# Patient Record
Sex: MALE | Race: WHITE | Employment: UNEMPLOYED | URBAN - NONMETROPOLITAN AREA
[De-identification: names, ages, dates, MRNs, and addresses within clinical notes are randomized per-mention and may not be internally consistent; named-entity substitution may affect disease eponyms.]

---

## 2017-07-16 ENCOUNTER — APPOINTMENT (OUTPATIENT)
Dept: GENERAL RADIOLOGY | Age: 57
End: 2017-07-16
Payer: COMMERCIAL

## 2017-07-16 ENCOUNTER — HOSPITAL ENCOUNTER (EMERGENCY)
Age: 57
Discharge: ANOTHER HOSPITAL | End: 2017-07-17
Attending: FAMILY MEDICINE
Payer: COMMERCIAL

## 2017-07-16 ENCOUNTER — APPOINTMENT (OUTPATIENT)
Dept: CT IMAGING | Age: 57
End: 2017-07-16
Payer: COMMERCIAL

## 2017-07-16 VITALS
HEART RATE: 86 BPM | SYSTOLIC BLOOD PRESSURE: 156 MMHG | WEIGHT: 145 LBS | BODY MASS INDEX: 24.75 KG/M2 | HEIGHT: 64 IN | OXYGEN SATURATION: 100 % | RESPIRATION RATE: 16 BRPM | TEMPERATURE: 97.6 F | DIASTOLIC BLOOD PRESSURE: 76 MMHG

## 2017-07-16 DIAGNOSIS — D72.829 LEUKOCYTOSIS, UNSPECIFIED TYPE: Primary | ICD-10-CM

## 2017-07-16 DIAGNOSIS — R40.20 LOC (LOSS OF CONSCIOUSNESS) (HCC): ICD-10-CM

## 2017-07-16 DIAGNOSIS — E16.2 HYPOGLYCEMIA: ICD-10-CM

## 2017-07-16 LAB
ACETAMINOPHEN LEVEL: < 5 UG/ML (ref 0–20)
AMPHETAMINE+METHAMPHETAMINE URINE SCREEN: NEGATIVE
ANION GAP SERPL CALCULATED.3IONS-SCNC: 16 MEQ/L (ref 8–16)
BACTERIA: ABNORMAL /HPF
BARBITURATE QUANTITATIVE URINE: NEGATIVE
BENZODIAZEPINE QUANTITATIVE URINE: NEGATIVE
BILIRUBIN URINE: NEGATIVE
BLOOD, URINE: NEGATIVE
BUN BLDV-MCNC: 23 MG/DL (ref 7–22)
CALCIUM SERPL-MCNC: 9.5 MG/DL (ref 8.5–10.5)
CANNABINOID QUANTITATIVE URINE: NEGATIVE
CASTS 2: ABNORMAL /LPF
CASTS UA: ABNORMAL /LPF
CHARACTER, URINE: CLEAR
CHLORIDE BLD-SCNC: 93 MEQ/L (ref 98–111)
CO2: 25 MEQ/L (ref 23–33)
COCAINE METABOLITE QUANTITATIVE URINE: NEGATIVE
COLOR: YELLOW
CREAT SERPL-MCNC: 0.8 MG/DL (ref 0.4–1.2)
CRYSTALS, UA: ABNORMAL
DIFFERENTIAL, MANUAL: NORMAL
EPITHELIAL CELLS, UA: ABNORMAL /HPF
ETHYL ALCOHOL, SERUM: < 0.01 %
GFR SERPL CREATININE-BSD FRML MDRD: > 90 ML/MIN/1.73M2
GLUCOSE BLD-MCNC: 123 MG/DL (ref 70–108)
GLUCOSE BLD-MCNC: 142 MG/DL (ref 70–108)
GLUCOSE BLD-MCNC: 147 MG/DL (ref 70–108)
GLUCOSE BLD-MCNC: 153 MG/DL (ref 70–108)
GLUCOSE BLD-MCNC: 94 MG/DL (ref 70–108)
GLUCOSE URINE: 250 MG/DL
KETONES, URINE: NEGATIVE
LACTIC ACID: 2.2 MMOL/L (ref 0.5–2.2)
LEUKOCYTE ESTERASE, URINE: NEGATIVE
MISCELLANEOUS 2: ABNORMAL
NITRITE, URINE: NEGATIVE
OPIATES, URINE: NEGATIVE
OXYCODONE: NEGATIVE
PH UA: 7
PHENCYCLIDINE QUANTITATIVE URINE: NEGATIVE
POTASSIUM SERPL-SCNC: 4.7 MEQ/L (ref 3.5–5.2)
PROTEIN UA: NEGATIVE
RBC URINE: ABNORMAL /HPF
RENAL EPITHELIAL, UA: ABNORMAL
SALICYLATE, SERUM: < 0.3 MG/DL (ref 2–10)
SODIUM BLD-SCNC: 134 MEQ/L (ref 135–145)
SPECIFIC GRAVITY, URINE: 1.02 (ref 1–1.03)
TOTAL CK: 42 U/L (ref 55–170)
UROBILINOGEN, URINE: 1 EU/DL
WBC UA: ABNORMAL /HPF
YEAST: ABNORMAL

## 2017-07-16 PROCEDURE — 80329 ANALGESICS NON-OPIOID 1 OR 2: CPT

## 2017-07-16 PROCEDURE — G0480 DRUG TEST DEF 1-7 CLASSES: HCPCS

## 2017-07-16 PROCEDURE — 2580000003 HC RX 258: Performed by: FAMILY MEDICINE

## 2017-07-16 PROCEDURE — 36415 COLL VENOUS BLD VENIPUNCTURE: CPT

## 2017-07-16 PROCEDURE — 80048 BASIC METABOLIC PNL TOTAL CA: CPT

## 2017-07-16 PROCEDURE — 99285 EMERGENCY DEPT VISIT HI MDM: CPT

## 2017-07-16 PROCEDURE — 72125 CT NECK SPINE W/O DYE: CPT

## 2017-07-16 PROCEDURE — 83605 ASSAY OF LACTIC ACID: CPT

## 2017-07-16 PROCEDURE — 82550 ASSAY OF CK (CPK): CPT

## 2017-07-16 PROCEDURE — S0028 INJECTION, FAMOTIDINE, 20 MG: HCPCS | Performed by: FAMILY MEDICINE

## 2017-07-16 PROCEDURE — 70450 CT HEAD/BRAIN W/O DYE: CPT

## 2017-07-16 PROCEDURE — 82948 REAGENT STRIP/BLOOD GLUCOSE: CPT

## 2017-07-16 PROCEDURE — 80307 DRUG TEST PRSMV CHEM ANLYZR: CPT

## 2017-07-16 PROCEDURE — 85025 COMPLETE CBC W/AUTO DIFF WBC: CPT

## 2017-07-16 PROCEDURE — 2500000003 HC RX 250 WO HCPCS: Performed by: FAMILY MEDICINE

## 2017-07-16 PROCEDURE — 71010 XR CHEST PORTABLE: CPT

## 2017-07-16 PROCEDURE — 87040 BLOOD CULTURE FOR BACTERIA: CPT

## 2017-07-16 PROCEDURE — 96374 THER/PROPH/DIAG INJ IV PUSH: CPT

## 2017-07-16 PROCEDURE — 80320 DRUG SCREEN QUANTALCOHOLS: CPT

## 2017-07-16 PROCEDURE — 81001 URINALYSIS AUTO W/SCOPE: CPT

## 2017-07-16 RX ORDER — SODIUM CHLORIDE, SODIUM LACTATE, POTASSIUM CHLORIDE, CALCIUM CHLORIDE 600; 310; 30; 20 MG/100ML; MG/100ML; MG/100ML; MG/100ML
INJECTION, SOLUTION INTRAVENOUS CONTINUOUS
Status: DISCONTINUED | OUTPATIENT
Start: 2017-07-16 | End: 2017-07-17 | Stop reason: HOSPADM

## 2017-07-16 RX ORDER — BUDESONIDE AND FORMOTEROL FUMARATE DIHYDRATE 80; 4.5 UG/1; UG/1
2 AEROSOL RESPIRATORY (INHALATION) 2 TIMES DAILY
COMMUNITY

## 2017-07-16 RX ORDER — CALCIUM CARBONATE 200(500)MG
1 TABLET,CHEWABLE ORAL 4 TIMES DAILY PRN
COMMUNITY

## 2017-07-16 RX ORDER — AMITRIPTYLINE HYDROCHLORIDE 50 MG/1
50 TABLET, FILM COATED ORAL NIGHTLY
COMMUNITY

## 2017-07-16 RX ORDER — ASPIRIN 81 MG/1
81 TABLET ORAL DAILY
COMMUNITY

## 2017-07-16 RX ORDER — DIVALPROEX SODIUM 500 MG/1
500 TABLET, DELAYED RELEASE ORAL 3 TIMES DAILY
COMMUNITY
End: 2019-02-03

## 2017-07-16 RX ORDER — ALBUTEROL SULFATE 90 UG/1
2 AEROSOL, METERED RESPIRATORY (INHALATION) EVERY 6 HOURS PRN
COMMUNITY

## 2017-07-16 RX ORDER — FAMOTIDINE 20 MG/1
20 TABLET, FILM COATED ORAL DAILY
COMMUNITY
End: 2019-06-11

## 2017-07-16 RX ORDER — LOXAPINE SUCCINATE 10 MG/1
10 TABLET ORAL 2 TIMES DAILY
COMMUNITY
End: 2019-06-10

## 2017-07-16 RX ORDER — LISINOPRIL 5 MG/1
5 TABLET ORAL DAILY
COMMUNITY
End: 2019-02-03

## 2017-07-16 RX ADMIN — FAMOTIDINE 20 MG: 10 INJECTION, SOLUTION INTRAVENOUS at 23:40

## 2017-07-16 RX ADMIN — SODIUM CHLORIDE, POTASSIUM CHLORIDE, SODIUM LACTATE AND CALCIUM CHLORIDE: 600; 310; 30; 20 INJECTION, SOLUTION INTRAVENOUS at 19:43

## 2017-07-16 ASSESSMENT — PAIN SCALES - GENERAL: PAINLEVEL_OUTOF10: 8

## 2017-07-16 ASSESSMENT — ENCOUNTER SYMPTOMS
WHEEZING: 0
SORE THROAT: 0
SHORTNESS OF BREATH: 0
VOMITING: 0
EYE REDNESS: 0
DIARRHEA: 0
EYE DISCHARGE: 0
BACK PAIN: 0
ABDOMINAL PAIN: 0
RHINORRHEA: 0
NAUSEA: 0
COUGH: 0

## 2017-07-16 ASSESSMENT — PAIN DESCRIPTION - PAIN TYPE: TYPE: ACUTE PAIN

## 2017-07-16 ASSESSMENT — PAIN DESCRIPTION - FREQUENCY: FREQUENCY: CONTINUOUS

## 2017-07-16 ASSESSMENT — PAIN DESCRIPTION - ORIENTATION: ORIENTATION: LEFT

## 2017-07-16 ASSESSMENT — PAIN DESCRIPTION - LOCATION: LOCATION: RIB CAGE

## 2017-07-16 NOTE — ED NOTES
Patient resting on cot, states \"I feel much better. \" Patient alert and oriented at this time and shows no signs of hypoglycemia currently. No other needs voiced.      Lazarus Basset, RN  07/16/17 1944

## 2017-07-16 NOTE — ED PROVIDER NOTES
[facility]  eMERGENCY dEPARTMENT eNCOUnter          CHIEF COMPLAINT       Chief Complaint   Patient presents with    Hypoglycemia       Nurses Notes reviewed and I agree except as noted in the HPI. HISTORY OF PRESENT ILLNESS    Laura Correa is a 59 y.o. male who presents to the ED with CO for evaluation of hypoglycemia. The patient has history of type 1 diabetes. He states he takes long term and short term insulin but he is unsure of the name, in addition to sliding scale. During the count, they noticed the patient was unresponsive today. Nurse took his blood sugar and was noted to have a level of 30. He was given glucagon and patient regained consciousness after that. Patient had blood sugar level of 94 on arrival here. The patient states he has history of low blood sugar but has never passed out in the past. He states he received insulin 2 hours ago. He reports he did have a meal but was unable to eat it. The patient denies of any pain and states he feels back to normal. He admits to smoking cigarettes and denies drug and alcohol use. He also reports history of seizures and is unable to name his medication. The nurses are in charge of his medication. The HPI was provided by the patient. REVIEW OF SYSTEMS     Review of Systems   Constitutional: Negative for appetite change, chills, fatigue and fever. HENT: Negative for congestion, ear pain, rhinorrhea and sore throat. Eyes: Negative for discharge, redness and visual disturbance. Respiratory: Negative for cough, shortness of breath and wheezing. Cardiovascular: Negative for chest pain, palpitations and leg swelling. Gastrointestinal: Negative for abdominal pain, diarrhea, nausea and vomiting. Genitourinary: Negative for decreased urine volume, difficulty urinating and dysuria. Musculoskeletal: Negative for arthralgias, back pain, joint swelling and neck pain. Skin: Negative for pallor and rash. otherwise change. CRITICAL CARE:   None     CONSULTS:  None    PROCEDURES:  None     FINAL IMPRESSION      1. Leukocytosis, unspecified type    2. Hypoglycemia    3. LOC (loss of consciousness) ([facility] [address]          DISPOSITION/PLAN   Discharge back to [facility] inmate unit    PATIENT REFERRED TO:  No follow-up provider specified. DISCHARGE MEDICATIONS:  Discharge Medication List as of 7/17/2017 12:04 AM          (Please note that portions of this note were completed with a voice recognition program.  Efforts were made to edit the dictations but occasionally words are mis-transcribed.)    Scribe:  Yee Capone 7/16/17 6:27 PM Scribing for and in the presence of Flavia Vera MD.    Signed by: Pedro Lin, 07/17/17 2:19 AM    Provider:  I personally performed the services described in the documentation, reviewed and edited the documentation which was dictated to the scribe in my presence, and it accurately records my words and actions.     Flavia Vera MD 7/16/17 2:19 AM         Flavia Vera MD  07/17/17 0944

## 2017-07-17 LAB
ANISOCYTOSIS: ABNORMAL
BANDED NEUTROPHILS ABSOLUTE COUNT: 0.6 THOU/MM3
BANDS PRESENT: 2 %
BASOPHILS # BLD: 0 %
BASOPHILS ABSOLUTE: 0 THOU/MM3 (ref 0–0.1)
EOSINOPHIL # BLD: 0 %
EOSINOPHILS ABSOLUTE: 0 THOU/MM3 (ref 0–0.4)
HCT VFR BLD CALC: 33.8 % (ref 42–52)
HEMOGLOBIN: 11.4 GM/DL (ref 14–18)
LYMPHOCYTES # BLD: 6 %
LYMPHOCYTES ABSOLUTE: 1.7 THOU/MM3 (ref 1–4.8)
MCH RBC QN AUTO: 28.2 PG (ref 27–31)
MCHC RBC AUTO-ENTMCNC: 33.7 GM/DL (ref 33–37)
MCV RBC AUTO: 83.7 FL (ref 80–94)
MONOCYTES # BLD: 5 %
MONOCYTES ABSOLUTE: 1.4 THOU/MM3 (ref 0.4–1.3)
NUCLEATED RED BLOOD CELLS: 0 /100 WBC
PATHOLOGIST REVIEW: ABNORMAL
PDW BLD-RTO: 19.1 % (ref 11.5–14.5)
PLATELET # BLD: 305 THOU/MM3 (ref 130–400)
PLATELET ESTIMATE: ADEQUATE
PMV BLD AUTO: 7.8 MCM (ref 7.4–10.4)
RBC # BLD: 4.04 MILL/MM3 (ref 4.7–6.1)
RBC # BLD: NORMAL 10*6/UL
SEG NEUTROPHILS: 87 %
SEGMENTED NEUTROPHILS ABSOLUTE COUNT: 25.1 THOU/MM3 (ref 1.8–7.7)
WBC # BLD: 28.9 THOU/MM3 (ref 4.8–10.8)

## 2017-07-17 NOTE — ED NOTES
Pt complaining of some heartburn and requesting medication to relieve his pain. Dr. Kike Wilkes notified of pt's request and verbal order for medication obtained and order placed.      Sina Rooney RN  07/16/17 4416

## 2017-07-20 ENCOUNTER — HOSPITAL ENCOUNTER (EMERGENCY)
Age: 57
Discharge: ANOTHER HOSPITAL | End: 2017-07-20
Attending: INTERNAL MEDICINE
Payer: COMMERCIAL

## 2017-07-20 ENCOUNTER — APPOINTMENT (OUTPATIENT)
Dept: CT IMAGING | Age: 57
End: 2017-07-20
Payer: COMMERCIAL

## 2017-07-20 VITALS
TEMPERATURE: 100.3 F | HEIGHT: 64 IN | RESPIRATION RATE: 18 BRPM | SYSTOLIC BLOOD PRESSURE: 169 MMHG | BODY MASS INDEX: 24.75 KG/M2 | OXYGEN SATURATION: 97 % | HEART RATE: 108 BPM | DIASTOLIC BLOOD PRESSURE: 83 MMHG | WEIGHT: 145 LBS

## 2017-07-20 DIAGNOSIS — E87.20 LACTIC ACIDOSIS: ICD-10-CM

## 2017-07-20 DIAGNOSIS — K92.0 HEMATEMESIS WITH NAUSEA: Primary | ICD-10-CM

## 2017-07-20 DIAGNOSIS — E87.1 HYPONATREMIA: ICD-10-CM

## 2017-07-20 DIAGNOSIS — R73.9 HYPERGLYCEMIA: ICD-10-CM

## 2017-07-20 LAB
ALBUMIN SERPL-MCNC: 4.2 G/DL (ref 3.5–5.1)
ALP BLD-CCNC: 106 U/L (ref 38–126)
ALT SERPL-CCNC: 11 U/L (ref 11–66)
AMYLASE: 51 U/L (ref 20–104)
ANION GAP SERPL CALCULATED.3IONS-SCNC: 19 MEQ/L (ref 8–16)
ANISOCYTOSIS: ABNORMAL
AST SERPL-CCNC: 12 U/L (ref 5–40)
BASOPHILS # BLD: 0.3 %
BASOPHILS ABSOLUTE: 0.1 THOU/MM3 (ref 0–0.1)
BILIRUB SERPL-MCNC: 0.3 MG/DL (ref 0.3–1.2)
BILIRUBIN DIRECT: < 0.2 MG/DL (ref 0–0.3)
BUN BLDV-MCNC: 19 MG/DL (ref 7–22)
CALCIUM SERPL-MCNC: 10.1 MG/DL (ref 8.5–10.5)
CHLORIDE BLD-SCNC: 84 MEQ/L (ref 98–111)
CO2: 25 MEQ/L (ref 23–33)
CREAT SERPL-MCNC: 0.7 MG/DL (ref 0.4–1.2)
EOSINOPHIL # BLD: 0 %
EOSINOPHILS ABSOLUTE: 0 THOU/MM3 (ref 0–0.4)
GFR SERPL CREATININE-BSD FRML MDRD: > 90 ML/MIN/1.73M2
GLUCOSE BLD-MCNC: 301 MG/DL (ref 70–108)
GLUCOSE BLD-MCNC: 332 MG/DL (ref 70–108)
HCT VFR BLD CALC: 37.3 % (ref 42–52)
HEMOCCULT STL QL: POSITIVE
HEMOGLOBIN: 12.6 GM/DL (ref 14–18)
LACTIC ACID: 3 MMOL/L (ref 0.5–2.2)
LIPASE: 7.9 U/L (ref 5.6–51.3)
LYMPHOCYTES # BLD: 2 %
LYMPHOCYTES ABSOLUTE: 0.5 THOU/MM3 (ref 1–4.8)
MCH RBC QN AUTO: 28.3 PG (ref 27–31)
MCHC RBC AUTO-ENTMCNC: 33.6 GM/DL (ref 33–37)
MCV RBC AUTO: 84.1 FL (ref 80–94)
MONOCYTES # BLD: 6.2 %
MONOCYTES ABSOLUTE: 1.6 THOU/MM3 (ref 0.4–1.3)
NUCLEATED RED BLOOD CELLS: 0 /100 WBC
OSMOLALITY CALCULATION: 272.3 MOSMOL/KG (ref 275–300)
PDW BLD-RTO: 18.2 % (ref 11.5–14.5)
PLATELET # BLD: 354 THOU/MM3 (ref 130–400)
PMV BLD AUTO: 8.4 MCM (ref 7.4–10.4)
POTASSIUM SERPL-SCNC: 5.1 MEQ/L (ref 3.5–5.2)
PRO-BNP: 74.1 PG/ML (ref 0–900)
RBC # BLD: 4.44 MILL/MM3 (ref 4.7–6.1)
RBC # BLD: NORMAL 10*6/UL
SEG NEUTROPHILS: 91.5 %
SEGMENTED NEUTROPHILS ABSOLUTE COUNT: 23.2 THOU/MM3 (ref 1.8–7.7)
SODIUM BLD-SCNC: 128 MEQ/L (ref 135–145)
TOTAL PROTEIN: 7.5 G/DL (ref 6.1–8)
TROPONIN T: < 0.01 NG/ML
WBC # BLD: 25.4 THOU/MM3 (ref 4.8–10.8)

## 2017-07-20 PROCEDURE — C9113 INJ PANTOPRAZOLE SODIUM, VIA: HCPCS | Performed by: INTERNAL MEDICINE

## 2017-07-20 PROCEDURE — 96375 TX/PRO/DX INJ NEW DRUG ADDON: CPT

## 2017-07-20 PROCEDURE — 85025 COMPLETE CBC W/AUTO DIFF WBC: CPT

## 2017-07-20 PROCEDURE — 82948 REAGENT STRIP/BLOOD GLUCOSE: CPT

## 2017-07-20 PROCEDURE — 80053 COMPREHEN METABOLIC PANEL: CPT

## 2017-07-20 PROCEDURE — 99285 EMERGENCY DEPT VISIT HI MDM: CPT

## 2017-07-20 PROCEDURE — 82272 OCCULT BLD FECES 1-3 TESTS: CPT

## 2017-07-20 PROCEDURE — 83880 ASSAY OF NATRIURETIC PEPTIDE: CPT

## 2017-07-20 PROCEDURE — 82248 BILIRUBIN DIRECT: CPT

## 2017-07-20 PROCEDURE — 84484 ASSAY OF TROPONIN QUANT: CPT

## 2017-07-20 PROCEDURE — 96376 TX/PRO/DX INJ SAME DRUG ADON: CPT

## 2017-07-20 PROCEDURE — 74176 CT ABD & PELVIS W/O CONTRAST: CPT

## 2017-07-20 PROCEDURE — 2580000003 HC RX 258: Performed by: INTERNAL MEDICINE

## 2017-07-20 PROCEDURE — 93005 ELECTROCARDIOGRAM TRACING: CPT

## 2017-07-20 PROCEDURE — 6370000000 HC RX 637 (ALT 250 FOR IP): Performed by: INTERNAL MEDICINE

## 2017-07-20 PROCEDURE — 36415 COLL VENOUS BLD VENIPUNCTURE: CPT

## 2017-07-20 PROCEDURE — 96366 THER/PROPH/DIAG IV INF ADDON: CPT

## 2017-07-20 PROCEDURE — 82150 ASSAY OF AMYLASE: CPT

## 2017-07-20 PROCEDURE — 6360000002 HC RX W HCPCS: Performed by: INTERNAL MEDICINE

## 2017-07-20 PROCEDURE — 6370000000 HC RX 637 (ALT 250 FOR IP)

## 2017-07-20 PROCEDURE — 96365 THER/PROPH/DIAG IV INF INIT: CPT

## 2017-07-20 PROCEDURE — 83605 ASSAY OF LACTIC ACID: CPT

## 2017-07-20 PROCEDURE — 83690 ASSAY OF LIPASE: CPT

## 2017-07-20 RX ORDER — ACETAMINOPHEN 650 MG/1
650 SUPPOSITORY RECTAL ONCE
Status: COMPLETED | OUTPATIENT
Start: 2017-07-20 | End: 2017-07-20

## 2017-07-20 RX ORDER — PANTOPRAZOLE SODIUM 40 MG/10ML
40 INJECTION, POWDER, LYOPHILIZED, FOR SOLUTION INTRAVENOUS ONCE
Status: COMPLETED | OUTPATIENT
Start: 2017-07-20 | End: 2017-07-20

## 2017-07-20 RX ORDER — ACETAMINOPHEN 650 MG/1
SUPPOSITORY RECTAL
Status: COMPLETED
Start: 2017-07-20 | End: 2017-07-20

## 2017-07-20 RX ORDER — 0.9 % SODIUM CHLORIDE 0.9 %
1000 INTRAVENOUS SOLUTION INTRAVENOUS ONCE
Status: COMPLETED | OUTPATIENT
Start: 2017-07-20 | End: 2017-07-20

## 2017-07-20 RX ORDER — METOCLOPRAMIDE HYDROCHLORIDE 5 MG/ML
10 INJECTION INTRAMUSCULAR; INTRAVENOUS ONCE
Status: COMPLETED | OUTPATIENT
Start: 2017-07-20 | End: 2017-07-20

## 2017-07-20 RX ADMIN — SODIUM CHLORIDE 8 MG/HR: 9 INJECTION, SOLUTION INTRAVENOUS at 11:51

## 2017-07-20 RX ADMIN — ACETAMINOPHEN 650 MG: 650 SUPPOSITORY RECTAL at 15:47

## 2017-07-20 RX ADMIN — METOCLOPRAMIDE 10 MG: 5 INJECTION, SOLUTION INTRAMUSCULAR; INTRAVENOUS at 11:42

## 2017-07-20 RX ADMIN — Medication 15 ML: at 11:49

## 2017-07-20 RX ADMIN — PANTOPRAZOLE SODIUM 40 MG: 40 INJECTION, POWDER, FOR SOLUTION INTRAVENOUS at 11:47

## 2017-07-20 RX ADMIN — SODIUM CHLORIDE 1000 ML: 9 INJECTION, SOLUTION INTRAVENOUS at 11:28

## 2017-07-20 ASSESSMENT — ENCOUNTER SYMPTOMS
COUGH: 0
BACK PAIN: 0
VOMITING: 1
DIARRHEA: 0
WHEEZING: 0
ABDOMINAL PAIN: 1
SHORTNESS OF BREATH: 0
SORE THROAT: 0
BLOOD IN STOOL: 0
NAUSEA: 0

## 2017-07-20 ASSESSMENT — PAIN DESCRIPTION - LOCATION: LOCATION: ABDOMEN

## 2017-07-20 ASSESSMENT — PAIN DESCRIPTION - FREQUENCY: FREQUENCY: CONTINUOUS

## 2017-07-20 ASSESSMENT — PAIN SCALES - GENERAL: PAINLEVEL_OUTOF10: 10

## 2017-07-20 ASSESSMENT — PAIN DESCRIPTION - DESCRIPTORS: DESCRIPTORS: BURNING

## 2017-07-20 ASSESSMENT — PAIN DESCRIPTION - ORIENTATION: ORIENTATION: UPPER;MID;RIGHT;LEFT

## 2017-07-20 ASSESSMENT — PAIN DESCRIPTION - PAIN TYPE: TYPE: ACUTE PAIN

## 2017-07-20 NOTE — ED NOTES
Patient resting quietly in cot with guards at bedside. Denies any pain at this time. States he is nauseated. Dr. Tri Carcamo notified. Will continue to monitor.       Shaniqua Cordova RN  07/20/17 7326

## 2017-07-20 NOTE — ED NOTES
Dr. Chelsea Lundberg notified of patient's WBC of 25.4 and blood sugar of 301.        Reid Tee, SLIME  07/20/17 1086

## 2017-07-20 NOTE — ED PROVIDER NOTES
nervous/anxious. All other systems reviewed and are negative. PAST MEDICAL HISTORY    has a past medical history of Bipolar 1 disorder ([facility] [address] Cellulitis; COPD (chronic obstructive pulmonary disease) ([facility] [address] Diabetes mellitus ([facility] [address] GERD (gastroesophageal reflux disease); Headache; Hypertension; Hypothyroidism; Mandible fracture ([facility] [address] Multiple rib fractures; [facility] Sinusitis; and Subtrochanteric fracture ([facility] SURGICAL HISTORY      has a past surgical history that includes Appendectomy. CURRENT MEDICATIONS       Previous Medications    ALBUTEROL SULFATE  (90 BASE) MCG/ACT INHALER    Inhale 2 puffs into the lungs every 6 hours as needed for Wheezing    AMITRIPTYLINE (ELAVIL) 50 MG TABLET    Take 50 mg by mouth nightly    ASPIRIN EC 81 MG EC TABLET    Take 81 mg by mouth daily    BUDESONIDE-FORMOTEROL (SYMBICORT) 80-4.5 MCG/ACT AERO    Inhale 2 puffs into the lungs 2 times daily    CALCIUM CARBONATE (TUMS) 500 MG CHEWABLE TABLET    Take 1 tablet by mouth 4 times daily as needed for Heartburn 1-2 daily QID PRN    DIVALPROEX (DEPAKOTE) 500 MG DR TABLET    Take 500 mg by mouth 3 times daily    FAMOTIDINE (PEPCID) 20 MG TABLET    Take 20 mg by mouth daily    INSULIN NPH (HUMULIN N;NOVOLIN N) 100 UNIT/ML INJECTION VIAL    Inject 16 Units into the skin every morning    INSULIN NPH (HUMULIN N;NOVOLIN N) 100 UNIT/ML INJECTION VIAL    Inject 10 Units into the skin nightly Indications: with dinner    INSULIN REGULAR (HUMULIN R;NOVOLIN R) 100 UNIT/ML INJECTION    Inject 8 Units into the skin See Admin Instructions Indications: 8 units before meals three times a day.  Sliding scale  Greater than 401 call MD    LISINOPRIL (PRINIVIL;ZESTRIL) 5 MG TABLET    Take 5 mg by mouth daily    LOXAPINE (LOXITANE) 10 MG CAPSULE    Take 10 mg by mouth 2 times daily 1 tab PO every AM, and 3 tabs PO every PM.    METFORMIN (GLUCOPHAGE) 500 MG TABLET    Take 500 mg by mouth 2 times daily (with meals)       ALLERGIES is allergic to sulfa antibiotics and sulphasomidine. FAMILY HISTORY     has no family status information on file. family history is not on file. SOCIAL HISTORY      reports that he has quit smoking. His smoking use included Cigarettes. He smoked 1.00 pack per day. He does not have any smokeless tobacco history on file. He reports that he does not drink alcohol. PHYSICAL EXAM     INITIAL VITALS:  height is 5' 4\" (1.626 m) and weight is 145 lb (65.8 kg). His oral temperature is 99 °F (37.2 °C). His blood pressure is 175/84 (abnormal) and his pulse is 103. His respiration is 18 and oxygen saturation is 100%. Physical Exam   Constitutional: He is oriented to person, place, and time. Vital signs are normal. He appears well-developed and well-nourished. He is cooperative. Non-toxic appearance. He does not appear ill. HENT:   Head: Normocephalic. Right Ear: External ear normal. No drainage. Left Ear: External ear normal. No drainage. Nose: Nose normal. No epistaxis. Mouth/Throat: Mucous membranes are not dry and not cyanotic. Eyes: Conjunctivae and EOM are normal. Right eye exhibits no discharge. Left eye exhibits no discharge. No scleral icterus. Neck: Trachea normal, normal range of motion and phonation normal. Neck supple. No tracheal deviation present. Cardiovascular: Normal rate, regular rhythm, normal heart sounds and intact distal pulses. Exam reveals no gallop and no friction rub. No murmur heard. Pulses:       Radial pulses are 2+ on the right side   Pulmonary/Chest: Effort normal and breath sounds normal. No stridor. No respiratory distress. He has no wheezes. He has no rales. He exhibits no tenderness. Abdominal: Soft. Bowel sounds are normal. He exhibits no distension and no pulsatile midline mass. There is no tenderness. There is no rebound and no guarding. Musculoskeletal: Normal range of motion. He exhibits no edema or tenderness (No calf pain or tenderness.   No components within normal limits   BASIC METABOLIC PANEL - Abnormal; Notable for the following:     Sodium 128 (*)     Chloride 84 (*)     Glucose 332 (*)     All other components within normal limits   LACTIC ACID, PLASMA - Abnormal; Notable for the following:     Lactic Acid 3.0 (*)     All other components within normal limits   TROPONIN   BRAIN NATRIURETIC PEPTIDE   HEPATIC FUNCTION PANEL   AMYLASE   LIPASE   BLOOD OCCULT STOOL SCREEN #1       EMERGENCY DEPARTMENT COURSE:   Vitals:    Vitals:    07/20/17 1046 07/20/17 1211 07/20/17 1317   BP: (!) 162/86 (!) 168/83 (!) 175/84   Pulse: 97 109 103   Resp: 18 18 18   Temp: 99 °F (37.2 °C)     TempSrc: Oral     SpO2: 99% 97% 100%   Weight: 145 lb (65.8 kg)     Height: 5' 4\" (1.626 m)       10:51 AM: The patient was seen and evaluated. Patient was seen history and physical exam was performed. Patient remained stable here in the emergency department. This patient was evaluated by myself. Patient was resting on cart during examination. Patient did not appear in any distress. Physical examination revealed no abdominal tenderness. Patient was given Protonix, Reglan, Gi cocktail, and IV fluids. Patient verbalized some improvement of symptoms with these medications. Labs were ordered and reviewed. NA low at 128. CL low at 84. Glucose elevated at 332. Lactic acid elevated at 3.0. Imaging was ordered and reviewed. CT abdomen pelvis shows 1. The stomach is moderately distended containing fluid. No obvious wall thickening or obvious luminal mass within the stomach. Etiology of coffee-ground emesis. 2. Right posterior basilar pleural thickening and calcification is likely the sequela or an old/remote hemothorax secondary to rib fractures. Patient had a stable course here in the emergency department. However, based on the patient's laboratory values, physical exam findings, and radiologic imaging, it was decided to bring the patient into the hospital for admission.   We

## 2017-07-20 NOTE — ED NOTES
Patient resting quietly in cot with eyes closed and officers at bedside. Will continue to monitor.       Julian Lainez RN  07/20/17 8239

## 2017-07-21 LAB
EKG ATRIAL RATE: 96 BPM
EKG P AXIS: 68 DEGREES
EKG P-R INTERVAL: 146 MS
EKG Q-T INTERVAL: 346 MS
EKG QRS DURATION: 80 MS
EKG QTC CALCULATION (BAZETT): 437 MS
EKG R AXIS: 67 DEGREES
EKG T AXIS: 90 DEGREES
EKG VENTRICULAR RATE: 96 BPM

## 2017-07-22 LAB
BLOOD CULTURE, ROUTINE: NORMAL
BLOOD CULTURE, ROUTINE: NORMAL

## 2018-03-21 ENCOUNTER — APPOINTMENT (OUTPATIENT)
Dept: CT IMAGING | Age: 58
End: 2018-03-21
Payer: COMMERCIAL

## 2018-03-21 ENCOUNTER — HOSPITAL ENCOUNTER (EMERGENCY)
Age: 58
Discharge: ANOTHER HOSPITAL | End: 2018-03-21
Attending: FAMILY MEDICINE
Payer: COMMERCIAL

## 2018-03-21 ENCOUNTER — APPOINTMENT (OUTPATIENT)
Dept: GENERAL RADIOLOGY | Age: 58
End: 2018-03-21
Payer: COMMERCIAL

## 2018-03-21 VITALS
SYSTOLIC BLOOD PRESSURE: 180 MMHG | TEMPERATURE: 99.7 F | HEART RATE: 101 BPM | BODY MASS INDEX: 24.16 KG/M2 | RESPIRATION RATE: 18 BRPM | WEIGHT: 145 LBS | HEIGHT: 65 IN | OXYGEN SATURATION: 97 % | DIASTOLIC BLOOD PRESSURE: 65 MMHG

## 2018-03-21 DIAGNOSIS — A41.9 SEPSIS, DUE TO UNSPECIFIED ORGANISM: ICD-10-CM

## 2018-03-21 DIAGNOSIS — J18.9 PNEUMONIA DUE TO ORGANISM: ICD-10-CM

## 2018-03-21 DIAGNOSIS — D72.829 LEUKOCYTOSIS, UNSPECIFIED TYPE: ICD-10-CM

## 2018-03-21 DIAGNOSIS — R77.8 ELEVATED TROPONIN: ICD-10-CM

## 2018-03-21 DIAGNOSIS — R11.2 NON-INTRACTABLE VOMITING WITH NAUSEA, UNSPECIFIED VOMITING TYPE: Primary | ICD-10-CM

## 2018-03-21 DIAGNOSIS — K92.2 GASTROINTESTINAL HEMORRHAGE, UNSPECIFIED GASTROINTESTINAL HEMORRHAGE TYPE: ICD-10-CM

## 2018-03-21 LAB
ABO: NORMAL
ALBUMIN SERPL-MCNC: 4.4 G/DL (ref 3.5–5.1)
ALLEN TEST: POSITIVE
ALP BLD-CCNC: 93 U/L (ref 38–126)
ALT SERPL-CCNC: 16 U/L (ref 11–66)
AMPHETAMINE+METHAMPHETAMINE URINE SCREEN: NEGATIVE
ANION GAP SERPL CALCULATED.3IONS-SCNC: 19 MEQ/L (ref 8–16)
ANISOCYTOSIS: ABNORMAL
ANTIBODY SCREEN: NORMAL
AST SERPL-CCNC: 19 U/L (ref 5–40)
BACTERIA: ABNORMAL /HPF
BARBITURATE QUANTITATIVE URINE: NEGATIVE
BASE EXCESS (CALCULATED): 3.5 MMOL/L (ref -2.5–2.5)
BASOPHILS # BLD: 0.4 %
BASOPHILS ABSOLUTE: 0.1 THOU/MM3 (ref 0–0.1)
BENZODIAZEPINE QUANTITATIVE URINE: NEGATIVE
BETA-HYDROXYBUTYRATE: 2.84 MG/DL (ref 0.2–2.81)
BILIRUB SERPL-MCNC: 0.3 MG/DL (ref 0.3–1.2)
BILIRUBIN DIRECT: < 0.2 MG/DL (ref 0–0.3)
BILIRUBIN URINE: NEGATIVE
BLOOD, URINE: NEGATIVE
BUN BLDV-MCNC: 36 MG/DL (ref 7–22)
CALCIUM SERPL-MCNC: 9.7 MG/DL (ref 8.5–10.5)
CANNABINOID QUANTITATIVE URINE: NEGATIVE
CASTS 2: ABNORMAL /LPF
CASTS UA: ABNORMAL /LPF
CHARACTER, URINE: CLEAR
CHLORIDE BLD-SCNC: 74 MEQ/L (ref 98–111)
CO2: 23 MEQ/L (ref 23–33)
COCAINE METABOLITE QUANTITATIVE URINE: NEGATIVE
COLLECTED BY:: ABNORMAL
COLOR: YELLOW
CREAT SERPL-MCNC: 1.3 MG/DL (ref 0.4–1.2)
CRYSTALS, UA: ABNORMAL
DEVICE: ABNORMAL
EKG ATRIAL RATE: 98 BPM
EKG P AXIS: 76 DEGREES
EKG P-R INTERVAL: 154 MS
EKG Q-T INTERVAL: 330 MS
EKG QRS DURATION: 88 MS
EKG QTC CALCULATION (BAZETT): 421 MS
EKG R AXIS: 73 DEGREES
EKG T AXIS: 90 DEGREES
EKG VENTRICULAR RATE: 98 BPM
EOSINOPHIL # BLD: 0.1 %
EOSINOPHILS ABSOLUTE: 0 THOU/MM3 (ref 0–0.4)
EPITHELIAL CELLS, UA: ABNORMAL /HPF
ETHYL ALCOHOL, SERUM: < 0.01 %
FLU A ANTIGEN: NEGATIVE
FLU B ANTIGEN: NEGATIVE
GFR SERPL CREATININE-BSD FRML MDRD: 56 ML/MIN/1.73M2
GLUCOSE BLD-MCNC: 360 MG/DL (ref 70–108)
GLUCOSE URINE: >= 1000 MG/DL
HCO3: 27 MMOL/L (ref 23–28)
HCT VFR BLD CALC: 21.9 % (ref 42–52)
HCT VFR BLD CALC: 27.2 % (ref 42–52)
HEMOCCULT STL QL: NEGATIVE
HEMOGLOBIN: 7.1 GM/DL (ref 14–18)
HEMOGLOBIN: 8.6 GM/DL (ref 14–18)
HYPOCHROMIA: ABNORMAL
IFIO2: 2
KETONES, URINE: 15
LACTIC ACID: 2.7 MMOL/L (ref 0.5–2.2)
LACTIC ACID: 3.8 MMOL/L (ref 0.5–2.2)
LEUKOCYTE ESTERASE, URINE: NEGATIVE
LIPASE: 45.8 U/L (ref 5.6–51.3)
LYMPHOCYTES # BLD: 2.3 %
LYMPHOCYTES ABSOLUTE: 0.7 THOU/MM3 (ref 1–4.8)
MCH RBC QN AUTO: 21.2 PG (ref 27–31)
MCHC RBC AUTO-ENTMCNC: 31.6 GM/DL (ref 33–37)
MCV RBC AUTO: 67 FL (ref 80–94)
MICROCYTES: ABNORMAL
MISCELLANEOUS 2: ABNORMAL
MONOCYTES # BLD: 6.7 %
MONOCYTES ABSOLUTE: 2 THOU/MM3 (ref 0.4–1.3)
NITRITE, URINE: NEGATIVE
NUCLEATED RED BLOOD CELLS: 0 /100 WBC
O2 SATURATION: 94 %
OPIATES, URINE: NEGATIVE
OSMOLALITY CALCULATION: 257.6 MOSMOL/KG (ref 275–300)
OSMOLALITY URINE: 508 MOSMOL/KG (ref 250–750)
OXYCODONE: NEGATIVE
PATHOLOGIST REVIEW: ABNORMAL
PCO2: 37 MMHG (ref 35–45)
PDW BLD-RTO: 17.9 % (ref 11.5–14.5)
PH BLOOD GAS: 7.47 (ref 7.35–7.45)
PH UA: 6
PHENCYCLIDINE QUANTITATIVE URINE: NEGATIVE
PLATELET # BLD: 463 THOU/MM3 (ref 130–400)
PMV BLD AUTO: 8.6 FL (ref 7.4–10.4)
PO2: 66 MMHG (ref 71–104)
POTASSIUM SERPL-SCNC: 5.3 MEQ/L (ref 3.5–5.2)
PRO-BNP: 169.2 PG/ML (ref 0–900)
PROTEIN UA: ABNORMAL
RBC # BLD: 4.06 MILL/MM3 (ref 4.7–6.1)
RBC URINE: ABNORMAL /HPF
RENAL EPITHELIAL, UA: ABNORMAL
RH FACTOR: NORMAL
SEG NEUTROPHILS: 90.5 %
SEGMENTED NEUTROPHILS ABSOLUTE COUNT: 26.8 THOU/MM3 (ref 1.8–7.7)
SODIUM BLD-SCNC: 116 MEQ/L (ref 135–145)
SODIUM URINE: 28 MEQ/L
SOURCE, BLOOD GAS: ABNORMAL
SPECIFIC GRAVITY, URINE: 1.02 (ref 1–1.03)
TOTAL PROTEIN: 7.3 G/DL (ref 6.1–8)
TROPONIN T: 0.01 NG/ML
TROPONIN T: 0.02 NG/ML
UROBILINOGEN, URINE: 0.2 EU/DL
WBC # BLD: 29.6 THOU/MM3 (ref 4.8–10.8)
WBC UA: ABNORMAL /HPF
YEAST: ABNORMAL

## 2018-03-21 PROCEDURE — 81001 URINALYSIS AUTO W/SCOPE: CPT

## 2018-03-21 PROCEDURE — 87804 INFLUENZA ASSAY W/OPTIC: CPT

## 2018-03-21 PROCEDURE — 36415 COLL VENOUS BLD VENIPUNCTURE: CPT

## 2018-03-21 PROCEDURE — 99285 EMERGENCY DEPT VISIT HI MDM: CPT

## 2018-03-21 PROCEDURE — 84484 ASSAY OF TROPONIN QUANT: CPT

## 2018-03-21 PROCEDURE — 2580000003 HC RX 258: Performed by: FAMILY MEDICINE

## 2018-03-21 PROCEDURE — 83880 ASSAY OF NATRIURETIC PEPTIDE: CPT

## 2018-03-21 PROCEDURE — 74176 CT ABD & PELVIS W/O CONTRAST: CPT

## 2018-03-21 PROCEDURE — 85014 HEMATOCRIT: CPT

## 2018-03-21 PROCEDURE — 96365 THER/PROPH/DIAG IV INF INIT: CPT

## 2018-03-21 PROCEDURE — 71045 X-RAY EXAM CHEST 1 VIEW: CPT

## 2018-03-21 PROCEDURE — 84300 ASSAY OF URINE SODIUM: CPT

## 2018-03-21 PROCEDURE — 83605 ASSAY OF LACTIC ACID: CPT

## 2018-03-21 PROCEDURE — 82272 OCCULT BLD FECES 1-3 TESTS: CPT

## 2018-03-21 PROCEDURE — 6360000002 HC RX W HCPCS: Performed by: FAMILY MEDICINE

## 2018-03-21 PROCEDURE — P9016 RBC LEUKOCYTES REDUCED: HCPCS

## 2018-03-21 PROCEDURE — 80053 COMPREHEN METABOLIC PANEL: CPT

## 2018-03-21 PROCEDURE — G0480 DRUG TEST DEF 1-7 CLASSES: HCPCS

## 2018-03-21 PROCEDURE — 82010 KETONE BODYS QUAN: CPT

## 2018-03-21 PROCEDURE — 96376 TX/PRO/DX INJ SAME DRUG ADON: CPT

## 2018-03-21 PROCEDURE — 93005 ELECTROCARDIOGRAM TRACING: CPT | Performed by: FAMILY MEDICINE

## 2018-03-21 PROCEDURE — 80307 DRUG TEST PRSMV CHEM ANLYZR: CPT

## 2018-03-21 PROCEDURE — 86900 BLOOD TYPING SEROLOGIC ABO: CPT

## 2018-03-21 PROCEDURE — 36600 WITHDRAWAL OF ARTERIAL BLOOD: CPT

## 2018-03-21 PROCEDURE — 82248 BILIRUBIN DIRECT: CPT

## 2018-03-21 PROCEDURE — C9113 INJ PANTOPRAZOLE SODIUM, VIA: HCPCS | Performed by: FAMILY MEDICINE

## 2018-03-21 PROCEDURE — 86901 BLOOD TYPING SEROLOGIC RH(D): CPT

## 2018-03-21 PROCEDURE — 96368 THER/DIAG CONCURRENT INF: CPT

## 2018-03-21 PROCEDURE — 85025 COMPLETE CBC W/AUTO DIFF WBC: CPT

## 2018-03-21 PROCEDURE — 83690 ASSAY OF LIPASE: CPT

## 2018-03-21 PROCEDURE — 87040 BLOOD CULTURE FOR BACTERIA: CPT

## 2018-03-21 PROCEDURE — 96366 THER/PROPH/DIAG IV INF ADDON: CPT

## 2018-03-21 PROCEDURE — 36430 TRANSFUSION BLD/BLD COMPNT: CPT

## 2018-03-21 PROCEDURE — 82803 BLOOD GASES ANY COMBINATION: CPT

## 2018-03-21 PROCEDURE — 86850 RBC ANTIBODY SCREEN: CPT

## 2018-03-21 PROCEDURE — 86923 COMPATIBILITY TEST ELECTRIC: CPT

## 2018-03-21 PROCEDURE — 83935 ASSAY OF URINE OSMOLALITY: CPT

## 2018-03-21 PROCEDURE — 6370000000 HC RX 637 (ALT 250 FOR IP)

## 2018-03-21 PROCEDURE — 93010 ELECTROCARDIOGRAM REPORT: CPT | Performed by: NUCLEAR MEDICINE

## 2018-03-21 PROCEDURE — 85018 HEMOGLOBIN: CPT

## 2018-03-21 RX ORDER — ACETAMINOPHEN 325 MG/1
650 TABLET ORAL ONCE
Status: COMPLETED | OUTPATIENT
Start: 2018-03-21 | End: 2018-03-21

## 2018-03-21 RX ORDER — DOCUSATE SODIUM 100 MG/1
100 CAPSULE, LIQUID FILLED ORAL 2 TIMES DAILY
COMMUNITY
End: 2019-06-10

## 2018-03-21 RX ORDER — 0.9 % SODIUM CHLORIDE 0.9 %
250 INTRAVENOUS SOLUTION INTRAVENOUS ONCE
Status: DISCONTINUED | OUTPATIENT
Start: 2018-03-21 | End: 2018-03-22 | Stop reason: HOSPADM

## 2018-03-21 RX ORDER — OMEPRAZOLE 20 MG/1
20 CAPSULE, DELAYED RELEASE ORAL DAILY
COMMUNITY
End: 2019-06-10

## 2018-03-21 RX ORDER — SODIUM CHLORIDE 9 MG/ML
INJECTION, SOLUTION INTRAVENOUS CONTINUOUS
Status: DISCONTINUED | OUTPATIENT
Start: 2018-03-21 | End: 2018-03-22 | Stop reason: HOSPADM

## 2018-03-21 RX ORDER — 0.9 % SODIUM CHLORIDE 0.9 %
1000 INTRAVENOUS SOLUTION INTRAVENOUS ONCE
Status: COMPLETED | OUTPATIENT
Start: 2018-03-21 | End: 2018-03-21

## 2018-03-21 RX ORDER — PANTOPRAZOLE SODIUM 40 MG/10ML
40 INJECTION, POWDER, LYOPHILIZED, FOR SOLUTION INTRAVENOUS DAILY
Status: DISCONTINUED | OUTPATIENT
Start: 2018-03-21 | End: 2018-03-22 | Stop reason: HOSPADM

## 2018-03-21 RX ORDER — VANCOMYCIN HYDROCHLORIDE 1 G/200ML
1000 INJECTION, SOLUTION INTRAVENOUS ONCE
Status: COMPLETED | OUTPATIENT
Start: 2018-03-21 | End: 2018-03-21

## 2018-03-21 RX ORDER — HYDROXYZINE PAMOATE 50 MG/1
50 CAPSULE ORAL 3 TIMES DAILY PRN
COMMUNITY
End: 2019-06-10

## 2018-03-21 RX ORDER — ACETAMINOPHEN 325 MG/1
TABLET ORAL
Status: COMPLETED
Start: 2018-03-21 | End: 2018-03-21

## 2018-03-21 RX ORDER — ONDANSETRON 4 MG/1
4 TABLET, ORALLY DISINTEGRATING ORAL ONCE
Status: COMPLETED | OUTPATIENT
Start: 2018-03-21 | End: 2018-03-21

## 2018-03-21 RX ADMIN — ONDANSETRON 4 MG: 4 TABLET, ORALLY DISINTEGRATING ORAL at 13:50

## 2018-03-21 RX ADMIN — ACETAMINOPHEN 650 MG: 325 TABLET ORAL at 21:46

## 2018-03-21 RX ADMIN — SODIUM CHLORIDE 1000 ML: 9 INJECTION, SOLUTION INTRAVENOUS at 15:57

## 2018-03-21 RX ADMIN — CEFEPIME HYDROCHLORIDE 2 G: 2 INJECTION, POWDER, FOR SOLUTION INTRAVENOUS at 15:54

## 2018-03-21 RX ADMIN — PANTOPRAZOLE SODIUM 40 MG: 40 INJECTION, POWDER, FOR SOLUTION INTRAVENOUS at 13:51

## 2018-03-21 RX ADMIN — SODIUM CHLORIDE 8 MG/HR: 9 INJECTION, SOLUTION INTRAVENOUS at 14:12

## 2018-03-21 RX ADMIN — SODIUM CHLORIDE: 9 INJECTION, SOLUTION INTRAVENOUS at 14:18

## 2018-03-21 RX ADMIN — VANCOMYCIN HYDROCHLORIDE 1000 MG: 1 INJECTION, SOLUTION INTRAVENOUS at 16:43

## 2018-03-21 ASSESSMENT — ENCOUNTER SYMPTOMS
SORE THROAT: 0
NAUSEA: 1
WHEEZING: 0
DIARRHEA: 0
SHORTNESS OF BREATH: 0
EYE REDNESS: 0
BACK PAIN: 0
ABDOMINAL PAIN: 1
RHINORRHEA: 0
VOMITING: 1
BLOOD IN STOOL: 1
COUGH: 0
EYE DISCHARGE: 0

## 2018-03-21 ASSESSMENT — PAIN DESCRIPTION - LOCATION: LOCATION: ABDOMEN

## 2018-03-21 ASSESSMENT — PAIN DESCRIPTION - DESCRIPTORS: DESCRIPTORS: DULL

## 2018-03-21 ASSESSMENT — PAIN DESCRIPTION - PAIN TYPE: TYPE: ACUTE PAIN

## 2018-03-21 ASSESSMENT — PAIN DESCRIPTION - ORIENTATION: ORIENTATION: RIGHT;UPPER

## 2018-03-21 ASSESSMENT — PAIN SCALES - GENERAL: PAINLEVEL_OUTOF10: 5

## 2018-03-21 NOTE — ED PROVIDER NOTES
[facility]  eMERGENCY dEPARTMENT eNCOUnter          CHIEF COMPLAINT       Chief Complaint   Patient presents with    GI Bleeding    Emesis       Nurses Notes reviewed and I agree except as noted in the HPI. HISTORY OF PRESENT ILLNESS    Doug Key is a 59 y.o. male who presents to the Emergency Department via EMS for the evaluation of gi bleeding. The patient currently lives in the assisted living [place]. The patient has a history of gi bleed and was admitted last year at [facility]. The patient reports nausea, vomiting, abdominal pain and blood in stool. The patient has a history of dementia. The patient is also a poor historian. The HPI was provided by the patient. REVIEW OF SYSTEMS     Review of Systems   Constitutional: Negative for appetite change, chills, fatigue and fever. HENT: Negative for congestion, ear pain, rhinorrhea and sore throat. Eyes: Negative for discharge, redness and visual disturbance. Respiratory: Negative for cough, shortness of breath and wheezing. Cardiovascular: Negative for chest pain, palpitations and leg swelling. Gastrointestinal: Positive for abdominal pain, blood in stool, nausea and vomiting. Negative for diarrhea. Genitourinary: Negative for decreased urine volume, difficulty urinating and dysuria. Musculoskeletal: Negative for arthralgias, back pain, joint swelling and neck pain. Skin: Negative for pallor and rash. Allergic/Immunologic: Negative for environmental allergies. Neurological: Negative for dizziness, syncope, weakness, light-headedness and headaches. Hematological: Negative for adenopathy. Psychiatric/Behavioral: Negative for agitation, confusion, dysphoric mood and suicidal ideas. The patient is not nervous/anxious. PAST MEDICAL HISTORY    has a past medical history of Bipolar 1 disorder ([address] Cellulitis; COPD (chronic obstructive pulmonary disease) ([facility] [address] Diabetes mellitus ([facility] [address]  Emphysema lung TABLET    Take 1,000 mg by mouth 2 times daily (with meals)     OMEPRAZOLE (PRILOSEC) 20 MG DELAYED RELEASE CAPSULE    Take 20 mg by mouth daily       ALLERGIES     is allergic to sulfa antibiotics and sulphasomidine. FAMILY HISTORY     has no family status information on file. family history is not on file. SOCIAL HISTORY      reports that he has quit smoking. His smoking use included Cigarettes. He smoked 1.00 pack per day. He does not have any smokeless tobacco history on file. He reports that he does not drink alcohol or use drugs. PHYSICAL EXAM     INITIAL VITALS:  height is 5' 5\" (1.651 m) and weight is 145 lb (65.8 kg). His oral temperature is 98.6 °F (37 °C). His blood pressure is 154/71 (abnormal) and his pulse is 105. His respiration is 24 and oxygen saturation is 93%. Physical Exam   Constitutional: He is oriented to person, place, and time. He appears well-developed and well-nourished. HENT:   Head: Normocephalic and atraumatic. Right Ear: External ear normal.   Left Ear: External ear normal.   Eyes: Conjunctivae are normal. Right eye exhibits no discharge. Left eye exhibits no discharge. No scleral icterus. Neck: Normal range of motion. Neck supple. No JVD present. Cardiovascular: Normal rate, regular rhythm and normal heart sounds. Exam reveals no gallop and no friction rub. No murmur heard. Pulmonary/Chest: Effort normal and breath sounds normal. No respiratory distress. He has no decreased breath sounds. He has no wheezes. He has no rhonchi. He has no rales. Abdominal: Soft. He exhibits distension. There is tenderness in the epigastric area. There is no rebound and no guarding. Genitourinary:   Genitourinary Comments: No blood on rectal exam.    Musculoskeletal: Normal range of motion. He exhibits no edema. Neurological: He is alert and oriented to person, place, and time. He exhibits normal muscle tone. He displays no seizure activity. GCS eye subscore is 4.  GCS HEMOGLOBIN AND HEMATOCRIT, BLOOD   TROPONIN   LACTIC ACID, PLASMA       EMERGENCY DEPARTMENT COURSE:   Vitals:    Vitals:    03/21/18 1223   BP: (!) 166/66   Pulse: 99   Resp: 19   TempSrc: Oral   SpO2: 95%   Weight: 145 lb (65.8 kg)   Height: 5' 5\" (1.651 m)       12:27 PM: The patient was seen and evaluated. MDM:  The patient was seen and evaluated within the department today following gi bleeding. Within the department, I observed the patient's vital signs to show hypertension. On exam, I appreciated abdominal distention with epigastric tenderness. Radiological studies revealed hyperinflated lungs and constipation. Laboratory work showed multiple abnormalities elevated white blood cell count, normal hemoglobin, elevated troponin, acute kidney injury, hyponatremia. Patient was started on IV hydration. Provided with antibiotics. Started on Protonix. Blood cultures obtained. CT showed evidence of pneumonia. Other investigation lab work as mentioned above. Within the department, the patient was treated with IV bolus, Vancocin, Maxipime, Protonix and Zofran. I explained my proposed course of treatment to the patient, and they were amenable to my decision. The patient was transferred to [facility] under Dr. Joesph Tnag. Patient was signed out to Dr. Bj Diana pending repeat trop lactic and hemoglobin until patient gets transferred to [facility]. CRITICAL CARE:   None     CONSULTS:  None    PROCEDURES:  None     FINAL IMPRESSION      1. Non-intractable vomiting with nausea, unspecified vomiting type    2. Gastrointestinal hemorrhage, unspecified gastrointestinal hemorrhage type    3. Sepsis, due to unspecified organism ([address]    4. Pneumonia due to organism    5. Leukocytosis, unspecified type    6. Elevated troponin          DISPOSITION/PLAN   Transfer     PATIENT REFERRED TO:  No follow-up provider specified.     DISCHARGE MEDICATIONS:  New Prescriptions    No medications on file       (Please note that portions of this note were completed with a voice recognition program.  Efforts were made to edit the dictations but occasionally words are mis-transcribed.)    Scribe:  Abhi Adams 3/21/18 12:27 PM Scribing for and in the presence of Raleigh Carrington MD.    Signed by: Pedro Kumar, 03/21/18 6:10 PM    Provider:  I personally performed the services described in the documentation, reviewed and edited the documentation which was dictated to the scribe in my presence, and it accurately records my words and actions.     Raleigh Carrington MD 3/21/18 6:10 PM                          Raleigh Carrington MD  03/21/18 [id number]

## 2018-03-21 NOTE — ED TRIAGE NOTES
Pt presents to rm 24 via [facility] from [facility] c/o coffee ground emesis, rectal bleeding and hypoactive bowel sounds since about 0530 this AM. Guards at bedside. Pt from dementia unit, but able to answer questions appropriately. VSS. Updated on POC. Dr. Gabriel Patel at bedside for evaluation. Will monitor.

## 2018-03-22 NOTE — ED PROVIDER NOTES
atherosclerosis. SI joints appear symmetric. Left hip prosthesis. 1. Healing posterior lateral rib fractures bilaterally. 2. Minimal groundglass opacity within the left lower lobe, possible atelectasis, infiltrate is not excluded. 3.There is a large amount of stool throughout the colon correlate for constipation. 4. Distended stomach fluid and food debris. Hiatal hernia and distended distal esophagus. Limited evaluation of the distal esophagus, in the setting of vomiting, further evaluation may be indicated. **This report has been created using voice recognition software. It may contain minor errors which are inherent in voice recognition technology. ** Final report electronically signed by Dr. Mccoy Daily on 3/21/2018 3:06 PM    Xr Chest Portable    Result Date: 3/21/2018  PROCEDURE: XR CHEST PORTABLE CLINICAL INFORMATION: leukocytosis, . COMPARISON: July 16, 2017 TECHNIQUE: AP Portable chest xray FINDINGS: Eventration of the hemidiaphragm. Hyperinflated lungs suggesting COPD. Underlying chronic changes within the lung bases. . Cardiac mediastinal silhouette is within normal limits. Costophrenic recesses are preserved. No acute osseous findings. Old, chronic deformities multiple ribs bilaterally. Hyperinflated lungs suggesting COPD. **This report has been created using voice recognition software. It may contain minor errors which are inherent in voice recognition technology. ** Final report electronically signed by Dr. Mccoy Daily on 3/21/2018 2:40 PM  Labs  Labs Reviewed   CBC WITH AUTO DIFFERENTIAL - Abnormal; Notable for the following:        Result Value    WBC 29.6 (*)     RBC 4.06 (*)     Hemoglobin 8.6 (*)     Hematocrit 27.2 (*)     MCV 67.0 (*)     MCH 21.2 (*)     MCHC 31.6 (*)     RDW 17.9 (*)     Platelets 261 (*)     Segs Absolute 26.8 (*)     Lymphocytes # 0.7 (*)     Monocytes # 2.0 (*)     All other components within normal limits   BASIC METABOLIC PANEL - Abnormal; Notable for the following:

## 2018-03-27 LAB
BLOOD CULTURE, ROUTINE: NORMAL
BLOOD CULTURE, ROUTINE: NORMAL

## 2018-06-05 ENCOUNTER — HOSPITAL ENCOUNTER (EMERGENCY)
Age: 58
Discharge: HOME | End: 2018-06-05
Attending: FAMILY MEDICINE
Payer: COMMERCIAL

## 2018-06-05 VITALS
SYSTOLIC BLOOD PRESSURE: 158 MMHG | RESPIRATION RATE: 16 BRPM | HEART RATE: 75 BPM | OXYGEN SATURATION: 97 % | DIASTOLIC BLOOD PRESSURE: 90 MMHG

## 2018-06-05 DIAGNOSIS — E16.2 HYPOGLYCEMIA: Primary | ICD-10-CM

## 2018-06-05 DIAGNOSIS — R40.4 TRANSIENT ALTERATION OF AWARENESS: ICD-10-CM

## 2018-06-05 LAB
ANION GAP SERPL CALCULATED.3IONS-SCNC: 14 MEQ/L (ref 8–16)
BUN BLDV-MCNC: 22 MG/DL (ref 7–22)
CALCIUM SERPL-MCNC: 10 MG/DL (ref 8.5–10.5)
CHLORIDE BLD-SCNC: 96 MEQ/L (ref 98–111)
CO2: 28 MEQ/L (ref 23–33)
CREAT SERPL-MCNC: 0.8 MG/DL (ref 0.4–1.2)
GFR SERPL CREATININE-BSD FRML MDRD: > 90 ML/MIN/1.73M2
GLUCOSE BLD-MCNC: 109 MG/DL (ref 70–108)
GLUCOSE BLD-MCNC: 113 MG/DL (ref 70–108)
GLUCOSE BLD-MCNC: 136 MG/DL (ref 70–108)
GLUCOSE BLD-MCNC: 174 MG/DL (ref 70–108)
LACTIC ACID: 1.5 MMOL/L (ref 0.5–2.2)
OSMOLALITY CALCULATION: 281.1 MOSMOL/KG (ref 275–300)
POTASSIUM SERPL-SCNC: 4.2 MEQ/L (ref 3.5–5.2)
SCAN OF BLOOD SMEAR: NORMAL
SODIUM BLD-SCNC: 138 MEQ/L (ref 135–145)

## 2018-06-05 PROCEDURE — 82948 REAGENT STRIP/BLOOD GLUCOSE: CPT

## 2018-06-05 PROCEDURE — 80048 BASIC METABOLIC PNL TOTAL CA: CPT

## 2018-06-05 PROCEDURE — 36415 COLL VENOUS BLD VENIPUNCTURE: CPT

## 2018-06-05 PROCEDURE — 83605 ASSAY OF LACTIC ACID: CPT

## 2018-06-05 PROCEDURE — 85025 COMPLETE CBC W/AUTO DIFF WBC: CPT

## 2018-06-05 PROCEDURE — 2580000003 HC RX 258: Performed by: FAMILY MEDICINE

## 2018-06-05 PROCEDURE — 99285 EMERGENCY DEPT VISIT HI MDM: CPT

## 2018-06-05 PROCEDURE — 96361 HYDRATE IV INFUSION ADD-ON: CPT

## 2018-06-05 PROCEDURE — 96360 HYDRATION IV INFUSION INIT: CPT

## 2018-06-05 RX ORDER — SODIUM CHLORIDE 9 MG/ML
INJECTION, SOLUTION INTRAVENOUS CONTINUOUS
Status: DISCONTINUED | OUTPATIENT
Start: 2018-06-05 | End: 2018-06-06 | Stop reason: HOSPADM

## 2018-06-05 RX ORDER — DEXTROSE MONOHYDRATE 25 G/50ML
25 INJECTION, SOLUTION INTRAVENOUS PRN
Status: DISCONTINUED | OUTPATIENT
Start: 2018-06-05 | End: 2018-06-06 | Stop reason: HOSPADM

## 2018-06-05 RX ADMIN — SODIUM CHLORIDE: 9 INJECTION, SOLUTION INTRAVENOUS at 19:15

## 2018-06-05 ASSESSMENT — ENCOUNTER SYMPTOMS
SHORTNESS OF BREATH: 0
VOMITING: 0
EYE DISCHARGE: 0
NAUSEA: 0
ABDOMINAL PAIN: 0

## 2018-06-06 LAB
ANISOCYTOSIS: ABNORMAL
BASOPHILIA: SLIGHT
BASOPHILIC STIPPLING: SLIGHT
BASOPHILS # BLD: 0.4 %
BASOPHILS ABSOLUTE: 0.1 THOU/MM3 (ref 0–0.1)
ELLIPTOCYTES: ABNORMAL
EOSINOPHIL # BLD: 0.4 %
EOSINOPHILS ABSOLUTE: 0.1 THOU/MM3 (ref 0–0.4)
HCT VFR BLD CALC: 29.3 % (ref 42–52)
HEMOGLOBIN: 8.8 GM/DL (ref 14–18)
HYPOCHROMIA: ABNORMAL
LYMPHOCYTES # BLD: 5.7 %
LYMPHOCYTES ABSOLUTE: 2 THOU/MM3 (ref 1–4.8)
MCH RBC QN AUTO: 19.5 PG (ref 27–31)
MCHC RBC AUTO-ENTMCNC: 29.9 GM/DL (ref 33–37)
MCV RBC AUTO: 65.1 FL (ref 80–94)
MICROCYTES: ABNORMAL
MONOCYTES # BLD: 4.9 %
MONOCYTES ABSOLUTE: 1.7 THOU/MM3 (ref 0.4–1.3)
NUCLEATED RED BLOOD CELLS: 0 /100 WBC
PATHOLOGIST REVIEW: ABNORMAL
PDW BLD-RTO: 19.7 % (ref 11.5–14.5)
PLATELET # BLD: 499 THOU/MM3 (ref 130–400)
PLATELET ESTIMATE: ADEQUATE
PMV BLD AUTO: 8 FL (ref 7.4–10.4)
RBC # BLD: 4.5 MILL/MM3 (ref 4.7–6.1)
SCHISTOCYTES: ABNORMAL
SEG NEUTROPHILS: 88.6 %
SEGMENTED NEUTROPHILS ABSOLUTE COUNT: 30.8 THOU/MM3 (ref 1.8–7.7)
TARGET CELLS: ABNORMAL
WBC # BLD: 34.8 THOU/MM3 (ref 4.8–10.8)

## 2019-02-03 ENCOUNTER — APPOINTMENT (OUTPATIENT)
Dept: CT IMAGING | Age: 59
End: 2019-02-03
Payer: COMMERCIAL

## 2019-02-03 ENCOUNTER — APPOINTMENT (OUTPATIENT)
Dept: GENERAL RADIOLOGY | Age: 59
End: 2019-02-03
Payer: COMMERCIAL

## 2019-02-03 ENCOUNTER — HOSPITAL ENCOUNTER (EMERGENCY)
Age: 59
Discharge: ANOTHER HOSPITAL | End: 2019-02-03
Attending: FAMILY MEDICINE
Payer: COMMERCIAL

## 2019-02-03 VITALS
WEIGHT: 140 LBS | TEMPERATURE: 97.5 F | SYSTOLIC BLOOD PRESSURE: 131 MMHG | OXYGEN SATURATION: 98 % | BODY MASS INDEX: 23.3 KG/M2 | HEART RATE: 70 BPM | RESPIRATION RATE: 24 BRPM | DIASTOLIC BLOOD PRESSURE: 65 MMHG

## 2019-02-03 DIAGNOSIS — J44.1 COPD EXACERBATION (HCC): ICD-10-CM

## 2019-02-03 DIAGNOSIS — J18.9 PNEUMONIA DUE TO ORGANISM: Primary | ICD-10-CM

## 2019-02-03 LAB
ALBUMIN SERPL-MCNC: 3.8 G/DL (ref 3.5–5.1)
ALLEN TEST: NORMAL
ALP BLD-CCNC: 115 U/L (ref 38–126)
ALT SERPL-CCNC: 12 U/L (ref 11–66)
ANION GAP SERPL CALCULATED.3IONS-SCNC: 13 MEQ/L (ref 8–16)
ANISOCYTOSIS: PRESENT
APTT: 36.7 SECONDS (ref 22–38)
AST SERPL-CCNC: 18 U/L (ref 5–40)
BASE EXCESS (CALCULATED): 1.4 MMOL/L (ref -2.5–2.5)
BASOPHILIA: ABNORMAL
BASOPHILS # BLD: 0.7 %
BASOPHILS ABSOLUTE: 0.1 THOU/MM3 (ref 0–0.1)
BILIRUB SERPL-MCNC: 0.2 MG/DL (ref 0.3–1.2)
BUN BLDV-MCNC: 18 MG/DL (ref 7–22)
CALCIUM SERPL-MCNC: 9.5 MG/DL (ref 8.5–10.5)
CHLORIDE BLD-SCNC: 95 MEQ/L (ref 98–111)
CO2: 26 MEQ/L (ref 23–33)
COLLECTED BY:: NORMAL
CREAT SERPL-MCNC: 0.9 MG/DL (ref 0.4–1.2)
DEVICE: NORMAL
EKG ATRIAL RATE: 64 BPM
EKG P AXIS: 63 DEGREES
EKG P-R INTERVAL: 150 MS
EKG Q-T INTERVAL: 384 MS
EKG QRS DURATION: 68 MS
EKG QTC CALCULATION (BAZETT): 396 MS
EKG R AXIS: 69 DEGREES
EKG T AXIS: 81 DEGREES
EKG VENTRICULAR RATE: 64 BPM
EOSINOPHIL # BLD: 0.2 %
EOSINOPHILS ABSOLUTE: 0 THOU/MM3 (ref 0–0.4)
ERYTHROCYTE [DISTWIDTH] IN BLOOD BY AUTOMATED COUNT: 20.1 % (ref 11.5–14.5)
ERYTHROCYTE [DISTWIDTH] IN BLOOD BY AUTOMATED COUNT: 47.8 FL (ref 35–45)
FLU A ANTIGEN: NEGATIVE
FLU B ANTIGEN: NEGATIVE
GFR SERPL CREATININE-BSD FRML MDRD: 86 ML/MIN/1.73M2
GLUCOSE BLD-MCNC: 104 MG/DL (ref 70–108)
HCO3: 27 MMOL/L (ref 23–28)
HCT VFR BLD CALC: 26.5 % (ref 42–52)
HEMOGLOBIN: 7.5 GM/DL (ref 14–18)
HYPOCHROMIA: PRESENT
IFIO2: 3
IMMATURE GRANS (ABS): 0.1 THOU/MM3 (ref 0–0.07)
IMMATURE GRANULOCYTES: 0.6 %
INR BLD: 1.03 (ref 0.85–1.13)
LYMPHOCYTES # BLD: 7.1 %
LYMPHOCYTES ABSOLUTE: 1.2 THOU/MM3 (ref 1–4.8)
MAGNESIUM: 1.6 MG/DL (ref 1.6–2.4)
MCH RBC QN AUTO: 18.8 PG (ref 26–33)
MCHC RBC AUTO-ENTMCNC: 28.3 GM/DL (ref 32.2–35.5)
MCV RBC AUTO: 66.6 FL (ref 80–94)
MICROCYTES: PRESENT
MONOCYTES # BLD: 12 %
MONOCYTES ABSOLUTE: 2.1 THOU/MM3 (ref 0.4–1.3)
NUCLEATED RED BLOOD CELLS: 0 /100 WBC
O2 SATURATION: 97 %
OSMOLALITY CALCULATION: 270.4 MOSMOL/KG (ref 275–300)
PCO2: 44 MMHG (ref 35–45)
PH BLOOD GAS: 7.39 (ref 7.35–7.45)
PLATELET # BLD: 461 THOU/MM3 (ref 130–400)
PMV BLD AUTO: 9.1 FL (ref 9.4–12.4)
PO2: 89 MMHG (ref 71–104)
POIKILOCYTES: ABNORMAL
POTASSIUM SERPL-SCNC: 5.1 MEQ/L (ref 3.5–5.2)
PRO-BNP: 222.4 PG/ML (ref 0–900)
PROCALCITONIN: 0.08 NG/ML (ref 0.01–0.09)
RBC # BLD: 3.98 MILL/MM3 (ref 4.7–6.1)
SCAN OF BLOOD SMEAR: NORMAL
SEG NEUTROPHILS: 79.4 %
SEGMENTED NEUTROPHILS ABSOLUTE COUNT: 13.6 THOU/MM3 (ref 1.8–7.7)
SODIUM BLD-SCNC: 134 MEQ/L (ref 135–145)
SOURCE, BLOOD GAS: NORMAL
TOTAL PROTEIN: 6.9 G/DL (ref 6.1–8)
TROPONIN T: 0.01 NG/ML
WBC # BLD: 17.1 THOU/MM3 (ref 4.8–10.8)

## 2019-02-03 PROCEDURE — 71046 X-RAY EXAM CHEST 2 VIEWS: CPT

## 2019-02-03 PROCEDURE — 96375 TX/PRO/DX INJ NEW DRUG ADDON: CPT

## 2019-02-03 PROCEDURE — 84145 PROCALCITONIN (PCT): CPT

## 2019-02-03 PROCEDURE — 6370000000 HC RX 637 (ALT 250 FOR IP): Performed by: FAMILY MEDICINE

## 2019-02-03 PROCEDURE — 96365 THER/PROPH/DIAG IV INF INIT: CPT

## 2019-02-03 PROCEDURE — 85025 COMPLETE CBC W/AUTO DIFF WBC: CPT

## 2019-02-03 PROCEDURE — 6360000002 HC RX W HCPCS: Performed by: FAMILY MEDICINE

## 2019-02-03 PROCEDURE — 36415 COLL VENOUS BLD VENIPUNCTURE: CPT

## 2019-02-03 PROCEDURE — 83880 ASSAY OF NATRIURETIC PEPTIDE: CPT

## 2019-02-03 PROCEDURE — 82803 BLOOD GASES ANY COMBINATION: CPT

## 2019-02-03 PROCEDURE — 87040 BLOOD CULTURE FOR BACTERIA: CPT

## 2019-02-03 PROCEDURE — 93005 ELECTROCARDIOGRAM TRACING: CPT | Performed by: FAMILY MEDICINE

## 2019-02-03 PROCEDURE — 87804 INFLUENZA ASSAY W/OPTIC: CPT

## 2019-02-03 PROCEDURE — 85610 PROTHROMBIN TIME: CPT

## 2019-02-03 PROCEDURE — 83735 ASSAY OF MAGNESIUM: CPT

## 2019-02-03 PROCEDURE — 70450 CT HEAD/BRAIN W/O DYE: CPT

## 2019-02-03 PROCEDURE — 99285 EMERGENCY DEPT VISIT HI MDM: CPT

## 2019-02-03 PROCEDURE — 80053 COMPREHEN METABOLIC PANEL: CPT

## 2019-02-03 PROCEDURE — 93010 ELECTROCARDIOGRAM REPORT: CPT | Performed by: INTERNAL MEDICINE

## 2019-02-03 PROCEDURE — 94640 AIRWAY INHALATION TREATMENT: CPT

## 2019-02-03 PROCEDURE — 36600 WITHDRAWAL OF ARTERIAL BLOOD: CPT

## 2019-02-03 PROCEDURE — 85730 THROMBOPLASTIN TIME PARTIAL: CPT

## 2019-02-03 PROCEDURE — 84484 ASSAY OF TROPONIN QUANT: CPT

## 2019-02-03 RX ORDER — PROPRANOLOL HYDROCHLORIDE 80 MG/1
80 CAPSULE, EXTENDED RELEASE ORAL DAILY
COMMUNITY
End: 2019-06-11

## 2019-02-03 RX ORDER — METHYLPREDNISOLONE SODIUM SUCCINATE 40 MG/ML
40 INJECTION, POWDER, LYOPHILIZED, FOR SOLUTION INTRAMUSCULAR; INTRAVENOUS ONCE
Status: COMPLETED | OUTPATIENT
Start: 2019-02-03 | End: 2019-02-03

## 2019-02-03 RX ORDER — IPRATROPIUM BROMIDE AND ALBUTEROL SULFATE 2.5; .5 MG/3ML; MG/3ML
2 SOLUTION RESPIRATORY (INHALATION) ONCE
Status: COMPLETED | OUTPATIENT
Start: 2019-02-03 | End: 2019-02-03

## 2019-02-03 RX ORDER — LEVOFLOXACIN 5 MG/ML
500 INJECTION, SOLUTION INTRAVENOUS ONCE
Status: COMPLETED | OUTPATIENT
Start: 2019-02-03 | End: 2019-02-03

## 2019-02-03 RX ORDER — LAMOTRIGINE 150 MG/1
150 TABLET ORAL DAILY
COMMUNITY

## 2019-02-03 RX ADMIN — METHYLPREDNISOLONE SODIUM SUCCINATE 40 MG: 40 INJECTION, POWDER, FOR SOLUTION INTRAMUSCULAR; INTRAVENOUS at 11:05

## 2019-02-03 RX ADMIN — LEVOFLOXACIN 500 MG: 5 INJECTION, SOLUTION INTRAVENOUS at 11:06

## 2019-02-03 RX ADMIN — IPRATROPIUM BROMIDE AND ALBUTEROL SULFATE 2 AMPULE: .5; 3 SOLUTION RESPIRATORY (INHALATION) at 09:47

## 2019-02-03 ASSESSMENT — ENCOUNTER SYMPTOMS
ABDOMINAL DISTENTION: 0
WHEEZING: 1
RHINORRHEA: 0
COUGH: 1
VOMITING: 0
CHEST TIGHTNESS: 1
SHORTNESS OF BREATH: 1
CONSTIPATION: 0
BACK PAIN: 0
ABDOMINAL PAIN: 0
DIARRHEA: 0
NAUSEA: 0

## 2019-02-03 NOTE — ED PROVIDER NOTES
[facility]  eMERGENCY dEPARTMENT eNCOUnter          CHIEF COMPLAINT       Chief Complaint   Patient presents with    Shortness of Breath       Nurses Notes reviewed and I agree except as noted in the HPI. HISTORY OF PRESENT ILLNESS    Janis Hunter is a 62 y.o. male who presents to the Emergency Department for the evaluation of Cough, congestion and shortness of breath. Patient has history of COPD. Resident at [employer]. Denies fevers and chills. Increased SOB now requiring 3L of oxygen. For me, patient denies any cardiac history. Has not been taking any medications other than his inhalers, per patient. The HPI was provided by the patient. REVIEW OF SYSTEMS     Review of Systems   Constitutional: Negative for chills, diaphoresis, fatigue and fever. HENT: Negative for congestion, rhinorrhea and tinnitus. Respiratory: Positive for cough, chest tightness, shortness of breath and wheezing. Cardiovascular: Negative for chest pain and palpitations. Gastrointestinal: Negative for abdominal distention, abdominal pain, constipation, diarrhea, nausea and vomiting. Genitourinary: Negative for dysuria, frequency and hematuria. Musculoskeletal: Negative for back pain, neck pain and neck stiffness. Neurological: Negative for dizziness, syncope, weakness, light-headedness and headaches. Psychiatric/Behavioral: Negative for agitation and confusion. PAST MEDICAL HISTORY    has a past medical history of Bipolar 1 disorder ([address] Cellulitis; COPD (chronic obstructive pulmonary disease) ([address] Diabetes mellitus ([address] Emphysema lung ([address] Esophagitis; GERD (gastroesophageal reflux disease); Headache; Hyperlipidemia; Hypertension; Hypothyroidism; Intellectual disability; Mandible fracture ([address] Multiple rib fractures; Schizoaffective disorder ([address] Seizures ([address] Sinusitis; and Subtrochanteric fracture ([address]     SURGICAL HISTORY      has a past surgical history that includes Appendectomy and fracture surgery. CURRENT MEDICATIONS       Previous Medications    ALBUTEROL SULFATE  (90 BASE) MCG/ACT INHALER    Inhale 2 puffs into the lungs every 6 hours as needed for Wheezing    AMITRIPTYLINE (ELAVIL) 50 MG TABLET    Take 50 mg by mouth nightly    ASPIRIN EC 81 MG EC TABLET    Take 81 mg by mouth daily    BUDESONIDE-FORMOTEROL (SYMBICORT) 80-4.5 MCG/ACT AERO    Inhale 2 puffs into the lungs 2 times daily    CALCIUM CARBONATE (TUMS) 500 MG CHEWABLE TABLET    Take 1 tablet by mouth 4 times daily as needed for Heartburn 1-2 daily QID PRN    DOCUSATE SODIUM (COLACE) 100 MG CAPSULE    Take 100 mg by mouth 2 times daily    FAMOTIDINE (PEPCID) 20 MG TABLET    Take 20 mg by mouth daily    HYDROXYZINE (VISTARIL) 50 MG CAPSULE    Take 50 mg by mouth 3 times daily as needed for Itching    INSULIN NPH (HUMULIN N;NOVOLIN N) 100 UNIT/ML INJECTION VIAL    Inject 19 Units into the skin 2 times daily (before meals)     INSULIN NPH (HUMULIN N;NOVOLIN N) 100 UNIT/ML INJECTION VIAL    Inject 10 Units into the skin nightly Indications: with dinner    INSULIN REGULAR (HUMULIN R;NOVOLIN R) 100 UNIT/ML INJECTION    Inject 8 Units into the skin See Admin Instructions Indications: 8 units before meals three times a day. Sliding scale  Greater than 401 call MD    LAMOTRIGINE (LAMICTAL) 150 MG TABLET    Take 150 mg by mouth daily    LOXAPINE (LOXITANE) 10 MG CAPSULE    Take 10 mg by mouth 2 times daily 1 tab PO every AM, and 3 tabs PO every PM.    METFORMIN (GLUCOPHAGE) 500 MG TABLET    Take 1,000 mg by mouth 2 times daily (with meals)     OMEPRAZOLE (PRILOSEC) 20 MG DELAYED RELEASE CAPSULE    Take 20 mg by mouth daily    PROPRANOLOL (INDERAL LA) 80 MG EXTENDED RELEASE CAPSULE    Take 80 mg by mouth daily       ALLERGIES     is allergic to sulfa antibiotics and sulphasomidine. FAMILY HISTORY     has no family status information on file. family history is not on file.     SOCIAL HISTORY      reports that he has quit smoking. His smoking use included Cigarettes. He smoked 1.00 pack per day. He does not have any smokeless tobacco history on file. He reports that he does not drink alcohol or use drugs. PHYSICAL EXAM   INITIAL VITALS:  weight is 140 lb (63.5 kg). His oral temperature is 97.5 °F (36.4 °C). His blood pressure is 117/57 (abnormal) and his pulse is 65. His respiration is 26 and oxygen saturation is 100%. Physical Exam   Constitutional: He is oriented to person, place, and time. No distress. HENT:   Head: Normocephalic and atraumatic. Right Ear: External ear normal.   Left Ear: External ear normal.   Eyes: Pupils are equal, round, and reactive to light. Conjunctivae and EOM are normal. Right eye exhibits no discharge. Left eye exhibits no discharge. Neck: Normal range of motion. Neck supple. No JVD present. No tracheal deviation present. Cardiovascular: Normal rate and regular rhythm. Exam reveals no friction rub. No murmur heard. Pulmonary/Chest: No stridor. Appreciable rhonchi towards the right base. Mildly appreciated on the left base. Respiratory rate 30. Patient currently on 3 L of oxygen and saturating in the mid 90s. Audible wheeze also appreciated. Abdominal: Soft. He exhibits no distension and no mass. There is no tenderness. There is no rebound and no guarding. Musculoskeletal: Normal range of motion. He exhibits no edema, tenderness (No Tenderness) or deformity. Neurological: He is alert and oriented to person, place, and time. No sensory deficit. He exhibits normal muscle tone. Coordination normal.   Skin: Skin is warm and dry. Capillary refill takes less than 2 seconds. No rash noted. He is not diaphoretic. No erythema. No pallor. Psychiatric: He has a normal mood and affect.  Judgment normal.       GLASCOW COMA SCALE  NEUROMUSCULAR BLOCKADE PRIOR TO ARRIVAL [x]No []Yes    Variable  Score   Variable  Score  Eye opening [x]Spontaneous 4 Verbal  [x]Oriented  5     []To voice  3   []Confused  4    []To pain  2   []Inapp words  3    []None  1   []Incomp words 2       []None  1   Motor   [x]Obeys  6    []Localizes pain 5    []Withdraws(pain) 4    []Flexion(pain) 3  []Extension(pain) 2    []None  1     GCS Total = 15        DIFFERENTIAL DIAGNOSIS:   Diagnoses discussed with the patient and include but not limited to Sepsis, pneumonia, COPD exacerbation, ACS/MI, anemia    DIAGNOSTIC RESULTS     EKG: AllEKG's are interpreted by the Emergency Department Physician who either signs or Co-signs this chart in the absence of a cardiologist.  Normal sinus rhythm, ventricular rate 64, , QRS 68. . No ST changes. RADIOLOGY: non-plain film images(s) such as CT, Ultrasound and MRI are read by the radiologist.    XR CHEST STANDARD (2 VW)   Final Result   Stable radiographic appearance of the chest. No evidence of an acute process. **This report has been created using voice recognition software. It may contain minor errors which are inherent in voice recognition technology. **      Final report electronically signed by Dr. Master Quintanilla on 2/3/2019 10:53 AM      CT HEAD WO CONTRAST    (Results Pending)         LABS:   Labs Reviewed   CBC WITH AUTO DIFFERENTIAL - Abnormal; Notable for the following:        Result Value    WBC 17.1 (*)     RBC 3.98 (*)     Hemoglobin 7.5 (*)     Hematocrit 26.5 (*)     MCV 66.6 (*)     MCH 18.8 (*)     MCHC 28.3 (*)     RDW-CV 20.1 (*)     RDW-SD 47.8 (*)     Platelets 000 (*)     MPV 9.1 (*)     Segs Absolute 13.6 (*)     Monocytes # 2.1 (*)     Immature Grans (Abs) 0.10 (*)     All other components within normal limits   TROPONIN - Abnormal; Notable for the following:     Troponin T 0.013 (*)     All other components within normal limits   COMPREHENSIVE METABOLIC PANEL - Abnormal; Notable for the following:     Sodium 134 (*)     Chloride 95 (*)     Total Bilirubin 0.2 (*)     All other components within normal limits   OSMOLALITY - Abnormal; Notable for the following:     Osmolality Calc 270.4 (*)     All other components within normal limits   GLOMERULAR FILTRATION RATE, ESTIMATED - Abnormal; Notable for the following:     Est, Glom Filt Rate 86 (*)     All other components within normal limits   RAPID INFLUENZA A/B ANTIGENS   CULTURE BLOOD #1   CULTURE BLOOD #2   APTT   PROTIME-INR   MAGNESIUM   PROCALCITONIN   ANION GAP   SCAN OF BLOOD SMEAR   BRAIN NATRIURETIC PEPTIDE   URINE DRUG SCREEN   URINE RT REFLEX TO CULTURE   BLOOD GAS, ARTERIAL       EMERGENCY DEPARTMENT COURSE:   Vitals:    Vitals:    02/03/19 0932 02/03/19 1038 02/03/19 1155   BP: (!) 127/55 122/60 (!) 117/57   Pulse: 72 62 65   Resp: (!) 32 28 26   Temp: 97.5 °F (36.4 °C)     TempSrc: Oral     SpO2: 98% 100% 100%   Weight: 140 lb (63.5 kg)         9:46 AM: The patient was seen and evaluated in a timely fashion. CURB 65 - Score 1. (mortality 2.1)  PSI - 81      MEDICAL DECISIONMAKING:           Slightly improved symptomatically. Imaging and blood work discussed. Discussed leukocytosis of 17.1 and examination with rhonchi and crackles appreciated. At this time there is concern for possible pneumonia. Pro-calcitonin was normal.  Chest x-ray did not show any significant infiltrate. Patient continues to require 3 L of oxygen despite treatment for COPD exacerbation. At this time patient will likely need admission for further evaluation of COPD exacerbation secondary to possible pneumonia. Cultures drawn and the patient received 1 dose of Levaquin. Discussed case with [facility] who will kindly accept the transfer of this patient. Case discussed and precepted with Dr. Kiesha Bauer:   n/a     CONSULTS:  [facility] - ED to ED transfer (no inmate beds available)    PROCEDURES:  None    FINAL IMPRESSION      1. Pneumonia due to organism    2.  COPD exacerbation ([facility]          DISPOSITION/PLAN   transfer    PATIENT REFERRED TO:  No follow-up provider specified.     DISCHARGE MEDICATIONS:  New Prescriptions    No medications on file       (Please note that portions of this note were completed with a voice recognition program.  Efforts were made to edit the dictations but occasionally words aremis-transcribed.)       Lilia Garay MD  02/03/19 1523

## 2019-02-03 NOTE — ED NOTES
Presents with complaints of dizziness and shortness of breath. States that he has not been feeling well for about a week. States that he has a cough, nasal congestion.      Lula Costa RN  02/03/19 1933

## 2019-02-03 NOTE — ED NOTES
[facility] called and stated their new ETA would be 1330 or sooner.      Mercedes Aviles, SLIME  02/03/19 0859

## 2019-02-03 NOTE — ED PROVIDER NOTES
[facility]  eMERGENCY dEPARTMENT eNCOUnter   Physician Attestation    Pt Name: Ashanti Moreau  MRN: [id number]  [place] 6/26/1959  Date of evaluation: 2/3/19        Physician Note:    Evaluation:  I have personally performed a face-to-face evaluation on this patient. I have reviewed Dr. Tru Menchaca findings and agree. History and exam by me shows the following information. Patient's been short of breath, coughing wheezing    He has bilateral wheezes. CXR, 2 view, shows increased markings in right lower lobe possible pneumonia. WBC 17,100    Microcytic anemia 7.5 g which is chronic    Troponin 0.013    EKG showed sinus rhythm with rate 64. QRS complexes show normal axis, normal conduction. ST-T waves show T wave inversion in V1, V2, and AVL. Compared to old EKG, T wave inversion in V1/V2 is new. Renal function is normal    Pro-calcitonin 0.08    Given aerosol treatments, steroids IV    Cover with antibiotic for infection    As he is an inmate at [facility], he will be transferred to [facility] for admission. 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 Pneumonia due to organism    2. COPD exacerbation ([facility] [address]          DISPOSITION/PLAN  PATIENT REFERRED TO:  No follow-up provider specified.   DISCHARGE MEDICATIONS:  New Prescriptions    No medications on file         MD Emily Strange MD  02/03/19 6857

## 2019-02-03 NOTE — ED NOTES
Back from Ct scan. Guards at bedside. Denies any concerns at this time.      Elizabeth Cross RN  02/03/19 6800

## 2019-02-09 LAB
BLOOD CULTURE, ROUTINE: NORMAL
BLOOD CULTURE, ROUTINE: NORMAL

## 2019-06-10 ENCOUNTER — HOSPITAL ENCOUNTER (EMERGENCY)
Age: 59
Discharge: HOME | End: 2019-06-10
Attending: INTERNAL MEDICINE
Payer: COMMERCIAL

## 2019-06-10 ENCOUNTER — APPOINTMENT (OUTPATIENT)
Dept: GENERAL RADIOLOGY | Age: 59
End: 2019-06-10
Payer: COMMERCIAL

## 2019-06-10 ENCOUNTER — APPOINTMENT (OUTPATIENT)
Dept: CT IMAGING | Age: 59
End: 2019-06-10
Payer: COMMERCIAL

## 2019-06-10 VITALS
BODY MASS INDEX: 23.49 KG/M2 | OXYGEN SATURATION: 97 % | DIASTOLIC BLOOD PRESSURE: 79 MMHG | HEART RATE: 104 BPM | RESPIRATION RATE: 24 BRPM | TEMPERATURE: 98.6 F | SYSTOLIC BLOOD PRESSURE: 152 MMHG | HEIGHT: 65 IN | WEIGHT: 141 LBS

## 2019-06-10 DIAGNOSIS — K92.1 BLACK STOOLS: Primary | ICD-10-CM

## 2019-06-10 LAB
ABO: NORMAL
ALBUMIN SERPL-MCNC: 4.6 G/DL (ref 3.5–5.1)
ALP BLD-CCNC: 148 U/L (ref 38–126)
ALT SERPL-CCNC: 13 U/L (ref 11–66)
ANION GAP SERPL CALCULATED.3IONS-SCNC: 16 MEQ/L (ref 8–16)
ANTIBODY SCREEN: NORMAL
AST SERPL-CCNC: 11 U/L (ref 5–40)
BASOPHILS # BLD: 0.4 %
BASOPHILS ABSOLUTE: 0.1 THOU/MM3 (ref 0–0.1)
BILIRUB SERPL-MCNC: 0.4 MG/DL (ref 0.3–1.2)
BILIRUBIN DIRECT: < 0.2 MG/DL (ref 0–0.3)
BUN BLDV-MCNC: 38 MG/DL (ref 7–22)
CALCIUM SERPL-MCNC: 11.9 MG/DL (ref 8.5–10.5)
CHLORIDE BLD-SCNC: 87 MEQ/L (ref 98–111)
CO2: 31 MEQ/L (ref 23–33)
CREAT SERPL-MCNC: 1 MG/DL (ref 0.4–1.2)
EKG ATRIAL RATE: 109 BPM
EKG P AXIS: 69 DEGREES
EKG P-R INTERVAL: 122 MS
EKG Q-T INTERVAL: 316 MS
EKG QRS DURATION: 90 MS
EKG QTC CALCULATION (BAZETT): 425 MS
EKG R AXIS: 55 DEGREES
EKG T AXIS: 83 DEGREES
EKG VENTRICULAR RATE: 109 BPM
EOSINOPHIL # BLD: 0 %
EOSINOPHILS ABSOLUTE: 0 THOU/MM3 (ref 0–0.4)
ERYTHROCYTE [DISTWIDTH] IN BLOOD BY AUTOMATED COUNT: 15.3 % (ref 11.5–14.5)
ERYTHROCYTE [DISTWIDTH] IN BLOOD BY AUTOMATED COUNT: 46.6 FL (ref 35–45)
FERRITIN: 33 NG/ML (ref 22–322)
GFR SERPL CREATININE-BSD FRML MDRD: 76 ML/MIN/1.73M2
GLUCOSE BLD-MCNC: 358 MG/DL (ref 70–108)
HCT VFR BLD CALC: 48.1 % (ref 42–52)
HEMOCCULT STL QL: NEGATIVE
HEMOGLOBIN: 16.1 GM/DL (ref 14–18)
IMMATURE GRANS (ABS): 0.31 THOU/MM3 (ref 0–0.07)
IMMATURE GRANULOCYTES: 1.1 %
INR BLD: 0.98 (ref 0.85–1.13)
IRON SATURATION: 22 % (ref 20–50)
IRON: 77 UG/DL (ref 65–195)
LIPASE: 6.2 U/L (ref 5.6–51.3)
LYMPHOCYTES # BLD: 5.1 %
LYMPHOCYTES ABSOLUTE: 1.5 THOU/MM3 (ref 1–4.8)
MCH RBC QN AUTO: 28.6 PG (ref 26–33)
MCHC RBC AUTO-ENTMCNC: 33.5 GM/DL (ref 32.2–35.5)
MCV RBC AUTO: 85.6 FL (ref 80–94)
MONOCYTES # BLD: 5.8 %
MONOCYTES ABSOLUTE: 1.7 THOU/MM3 (ref 0.4–1.3)
NUCLEATED RED BLOOD CELLS: 0 /100 WBC
OSMOLALITY CALCULATION: 291.7 MOSMOL/KG (ref 275–300)
PLATELET # BLD: 419 THOU/MM3 (ref 130–400)
PMV BLD AUTO: 10.6 FL (ref 9.4–12.4)
POTASSIUM SERPL-SCNC: 4.3 MEQ/L (ref 3.5–5.2)
RBC # BLD: 5.62 MILL/MM3 (ref 4.7–6.1)
RH FACTOR: NORMAL
SCAN OF BLOOD SMEAR: NORMAL
SEG NEUTROPHILS: 87.6 %
SEGMENTED NEUTROPHILS ABSOLUTE COUNT: 25.2 THOU/MM3 (ref 1.8–7.7)
SMUDGE CELLS: PRESENT
SODIUM BLD-SCNC: 134 MEQ/L (ref 135–145)
TOTAL IRON BINDING CAPACITY: 357 UG/DL (ref 171–450)
TOTAL PROTEIN: 7.8 G/DL (ref 6.1–8)
WBC # BLD: 28.8 THOU/MM3 (ref 4.8–10.8)

## 2019-06-10 PROCEDURE — 96374 THER/PROPH/DIAG INJ IV PUSH: CPT

## 2019-06-10 PROCEDURE — 86850 RBC ANTIBODY SCREEN: CPT

## 2019-06-10 PROCEDURE — 86901 BLOOD TYPING SEROLOGIC RH(D): CPT

## 2019-06-10 PROCEDURE — 71045 X-RAY EXAM CHEST 1 VIEW: CPT

## 2019-06-10 PROCEDURE — 6360000002 HC RX W HCPCS: Performed by: INTERNAL MEDICINE

## 2019-06-10 PROCEDURE — 82248 BILIRUBIN DIRECT: CPT

## 2019-06-10 PROCEDURE — 36415 COLL VENOUS BLD VENIPUNCTURE: CPT

## 2019-06-10 PROCEDURE — 93010 ELECTROCARDIOGRAM REPORT: CPT | Performed by: NUCLEAR MEDICINE

## 2019-06-10 PROCEDURE — 2580000003 HC RX 258: Performed by: INTERNAL MEDICINE

## 2019-06-10 PROCEDURE — 83550 IRON BINDING TEST: CPT

## 2019-06-10 PROCEDURE — 74177 CT ABD & PELVIS W/CONTRAST: CPT

## 2019-06-10 PROCEDURE — 93005 ELECTROCARDIOGRAM TRACING: CPT | Performed by: INTERNAL MEDICINE

## 2019-06-10 PROCEDURE — 80053 COMPREHEN METABOLIC PANEL: CPT

## 2019-06-10 PROCEDURE — 82272 OCCULT BLD FECES 1-3 TESTS: CPT

## 2019-06-10 PROCEDURE — C9113 INJ PANTOPRAZOLE SODIUM, VIA: HCPCS | Performed by: INTERNAL MEDICINE

## 2019-06-10 PROCEDURE — 86900 BLOOD TYPING SEROLOGIC ABO: CPT

## 2019-06-10 PROCEDURE — 85610 PROTHROMBIN TIME: CPT

## 2019-06-10 PROCEDURE — 83540 ASSAY OF IRON: CPT

## 2019-06-10 PROCEDURE — 85025 COMPLETE CBC W/AUTO DIFF WBC: CPT

## 2019-06-10 PROCEDURE — 83690 ASSAY OF LIPASE: CPT

## 2019-06-10 PROCEDURE — 99284 EMERGENCY DEPT VISIT MOD MDM: CPT

## 2019-06-10 PROCEDURE — 82728 ASSAY OF FERRITIN: CPT

## 2019-06-10 PROCEDURE — 6360000004 HC RX CONTRAST MEDICATION: Performed by: INTERNAL MEDICINE

## 2019-06-10 RX ORDER — 0.9 % SODIUM CHLORIDE 0.9 %
10 VIAL (ML) INJECTION DAILY
Status: DISCONTINUED | OUTPATIENT
Start: 2019-06-10 | End: 2019-06-10 | Stop reason: HOSPADM

## 2019-06-10 RX ORDER — FERROUS SULFATE 325(65) MG
325 TABLET ORAL
COMMUNITY

## 2019-06-10 RX ORDER — ATORVASTATIN CALCIUM 40 MG/1
40 TABLET, FILM COATED ORAL DAILY
COMMUNITY

## 2019-06-10 RX ORDER — PANTOPRAZOLE SODIUM 20 MG/1
40 TABLET, DELAYED RELEASE ORAL DAILY
Qty: 60 TABLET | Refills: 0 | Status: SHIPPED | OUTPATIENT
Start: 2019-06-10 | End: 2019-06-11

## 2019-06-10 RX ORDER — PANTOPRAZOLE SODIUM 40 MG/10ML
40 INJECTION, POWDER, LYOPHILIZED, FOR SOLUTION INTRAVENOUS DAILY
Status: DISCONTINUED | OUTPATIENT
Start: 2019-06-10 | End: 2019-06-10 | Stop reason: HOSPADM

## 2019-06-10 RX ADMIN — PANTOPRAZOLE SODIUM 40 MG: 40 INJECTION, POWDER, FOR SOLUTION INTRAVENOUS at 09:46

## 2019-06-10 RX ADMIN — Medication 10 ML: at 09:46

## 2019-06-10 RX ADMIN — IOPAMIDOL 80 ML: 755 INJECTION, SOLUTION INTRAVENOUS at 10:31

## 2019-06-10 ASSESSMENT — ENCOUNTER SYMPTOMS
NAUSEA: 1
EYE REDNESS: 0
EYE DISCHARGE: 0
COUGH: 0
VOMITING: 1
BLOOD IN STOOL: 1
DIARRHEA: 0
RHINORRHEA: 0
SORE THROAT: 0
SHORTNESS OF BREATH: 0
WHEEZING: 0
ABDOMINAL PAIN: 1
BACK PAIN: 0

## 2019-06-10 ASSESSMENT — PAIN DESCRIPTION - PAIN TYPE: TYPE: ACUTE PAIN

## 2019-06-10 ASSESSMENT — PAIN DESCRIPTION - ORIENTATION: ORIENTATION: LEFT;UPPER

## 2019-06-10 ASSESSMENT — PAIN SCALES - GENERAL: PAINLEVEL_OUTOF10: 10

## 2019-06-10 ASSESSMENT — PAIN DESCRIPTION - LOCATION: LOCATION: ABDOMEN

## 2019-06-10 NOTE — ED NOTES
Bed: 005A  Expected date: 6/10/19  Expected time: 8:42 AM  Means of arrival: [facility]  Comments:     Mi Armendariz RN  06/10/19 9503

## 2019-06-10 NOTE — ED PROVIDER NOTES
[facility]  eMERGENCY dEPARTMENT eNCOUnter          CHIEF COMPLAINT       Chief Complaint   Patient presents with    Hematemesis    Rectal Bleeding       Nurses Notes reviewed and I agree except as noted in the HPI. HISTORY OF PRESENT ILLNESS    Joana Gonzalez is a 59 y.o. male who presents to the Emergency Department via EMS from [facility] for the evaluation of 3 episodes of coffee ground emesis and black stools for the past 2 days. Patient states he has a history of GI bleeds. He is currently taking aspirin and iron supplement. He also reports associated abdominal pain, nausea, fatigue, dizziness, and weakness. Patient denies fever, chills, urinary symptoms, or diarrhea. No further complaints at initial evaluation. The HPI was provided by the patient. REVIEW OF SYSTEMS     Review of Systems   Constitutional: Positive for fatigue. Negative for appetite change, chills and fever. HENT: Negative for congestion, ear pain, rhinorrhea and sore throat. Eyes: Negative for discharge, redness and visual disturbance. Respiratory: Negative for cough, shortness of breath and wheezing. Cardiovascular: Negative for chest pain, palpitations and leg swelling. Gastrointestinal: Positive for abdominal pain, blood in stool, nausea and vomiting. Negative for diarrhea. Genitourinary: Negative for decreased urine volume, difficulty urinating, dysuria and hematuria. Musculoskeletal: Negative for arthralgias, back pain, joint swelling and neck pain. Skin: Negative for pallor and rash. Allergic/Immunologic: Negative for environmental allergies. Neurological: Positive for dizziness and weakness. Negative for syncope, light-headedness, numbness and headaches. Hematological: Negative for adenopathy. Psychiatric/Behavioral: Negative for confusion and suicidal ideas. The patient is not nervous/anxious.         PAST MEDICALHISTORY    has a past medical history of Bipolar 1 disorder ([facility] [address] Cellulitis, COPD (chronic obstructive pulmonary disease) ([facility] [address] [facility] Emphysema lung ([facility] [address] [facility] Esophagitis, GERD (gastroesophageal reflux disease), Headache, Hyperlipidemia, Hypertension, Hypothyroidism, Intellectual disability, Mandible fracture ([facility] Multiple rib fractures, Schizoaffective disorder ([facility] Seizures ([facility] Sinusitis, and Subtrochanteric fracture ([facility] SURGICAL HISTORY    has a past surgical history that includes Appendectomy and fracture surgery. CURRENT MEDICATIONS       Previous Medications    ALBUTEROL SULFATE  (90 BASE) MCG/ACT INHALER    Inhale 2 puffs into the lungs every 6 hours as needed for Wheezing    AMITRIPTYLINE (ELAVIL) 50 MG TABLET    Take 50 mg by mouth nightly    ASPIRIN EC 81 MG EC TABLET    Take 81 mg by mouth daily    ATORVASTATIN (LIPITOR) 40 MG TABLET    Take 40 mg by mouth daily    BUDESONIDE-FORMOTEROL (SYMBICORT) 80-4.5 MCG/ACT AERO    Inhale 2 puffs into the lungs 2 times daily    CALCIUM CARBONATE (TUMS) 500 MG CHEWABLE TABLET    Take 1 tablet by mouth 4 times daily as needed for Heartburn 1-2 daily QID PRN    FAMOTIDINE (PEPCID) 20 MG TABLET    Take 20 mg by mouth daily    FERROUS SULFATE 325 (65 FE) MG TABLET    Take 325 mg by mouth daily (with breakfast)    INSULIN NPH (HUMULIN N;NOVOLIN N) 100 UNIT/ML INJECTION VIAL    Inject 19 Units into the skin 2 times daily (before meals)     INSULIN NPH (HUMULIN N;NOVOLIN N) 100 UNIT/ML INJECTION VIAL    Inject 10 Units into the skin nightly Indications: with dinner    INSULIN REGULAR (HUMULIN R;NOVOLIN R) 100 UNIT/ML INJECTION    Inject 8 Units into the skin See Admin Instructions Indications: 8 units before meals three times a day.  Sliding scale  Greater than 401 call MD    IPRATROPIUM (ATROVENT HFA) 17 MCG/ACT INHALER    Inhale 2 puffs into the lungs every 6 hours    LAMOTRIGINE (LAMICTAL) 150 MG TABLET    Take 150 mg by mouth daily    METFORMIN (GLUCOPHAGE) 500 MG TABLET    Take 1,000 mg by mouth 2 times daily (with meals)     PROPRANOLOL (INDERAL LA) 80 MG EXTENDED RELEASE CAPSULE    Take 80 mg by mouth daily       ALLERGIES     is allergic to sulfa antibiotics and sulphasomidine. FAMILY HISTORY     has no family status information on file. family history is not on file. SOCIAL HISTORY      reports that he has quit smoking. His smoking use included cigarettes. He smoked 1.00 pack per day. He does not have any smokeless tobacco history on file. He reports that he does not drink alcohol or use drugs. PHYSICAL EXAM     INITIAL VITALS:  height is 5' 5\" (1.651 m) and weight is 141 lb (64 kg). His oral temperature is 98.6 °F (37 °C). His blood pressure is 152/79 (abnormal) and his pulse is 104. His respiration is 24 and oxygen saturation is 97%. Physical Exam   Constitutional: He is oriented to person, place, and time. He appears well-developed and well-nourished. Non-toxic appearance. HENT:   Head: Normocephalic and atraumatic. Right Ear: Tympanic membrane and external ear normal.   Left Ear: Tympanic membrane and external ear normal.   Nose: Nose normal.   Mouth/Throat: Oropharynx is clear and moist and mucous membranes are normal. No oropharyngeal exudate, posterior oropharyngeal edema or posterior oropharyngeal erythema. Eyes: Conjunctivae and EOM are normal.   Neck: Normal range of motion. Neck supple. No JVD present. Cardiovascular: Regular rhythm, normal heart sounds, intact distal pulses and normal pulses. Tachycardia present. Exam reveals no gallop and no friction rub. No murmur heard. Pulmonary/Chest: Effort normal. No respiratory distress. He has no decreased breath sounds. He has no wheezes. He has no rhonchi. He has rales. Crackles throughout that were resolved with cough   Abdominal: Soft. Bowel sounds are normal. He exhibits no distension. There is tenderness (mild) in the epigastric area. There is no rebound, no guarding and no CVA tenderness. Genitourinary: Rectal exam shows no external hemorrhoid, no internal hemorrhoid, no fissure and no tenderness. Musculoskeletal: Normal range of motion. He exhibits no edema. Neurological: He is alert and oriented to person, place, and time. He exhibits normal muscle tone. Coordination normal.   Skin: Skin is warm and dry. No rash noted. He is not diaphoretic. Nursing note and vitals reviewed. DIAGNOSTIC RESULTS     EKG: All EKG's are interpreted by the Emergency Department Physician who either signs or Co-signs this chart in the absence of a cardiologist.  EKG interpreted by Micheal Tay MD:    Vent. Rate: 109 bpm  ND interval: 122 ms  QRS duration: 90 ms  QTc: 425 ms  P-R-T axes: 69, 55, 83  Sinus tachycardia. No STEMI. RADIOLOGY: non-plain film images(s) such as CT, Ultrasound and MRI are read by the radiologist.    CT ABDOMEN PELVIS W IV CONTRAST Additional Contrast? Radiologist Recommendation   Final Result       1. Stool throughout the colon suggesting constipation. 2. No CT explanation for the patient's melena. 3. Mild thickening of the gastroesophageal junction. Direct inspection is recommended. **This report has been created using voice recognition software. It may contain minor errors which are inherent in voice recognition technology. **      Final report electronically signed by Dr. Lisandra Cuevas on 6/10/2019 10:59 AM      XR CHEST PORTABLE   Final Result      Left mid and lower lung atelectasis/infiltrate. **This report has been created using voice recognition software. It may contain minor errors which are inherent in voice recognition technology. **      Final report electronically signed by Dr. Sosa Suggs on 6/10/2019 9:37 AM          LABS:   Labs Reviewed   CBC WITH AUTO DIFFERENTIAL - Abnormal; Notable for the following components:       Result Value    WBC 28.8 (*)     RDW-CV 15.3 (*)     RDW-SD 46.6 (*)     Platelets 479 (*)     Segs Absolute 25.2 (*)     Monocytes # 1.7 (*)     Immature Grans (Abs) 0.31 (*)     All other components within normal limits   BASIC METABOLIC PANEL - Abnormal; Notable for the following components:    Sodium 134 (*)     Chloride 87 (*)     Glucose 358 (*)     BUN 38 (*)     Calcium 11.9 (*)     All other components within normal limits   HEPATIC FUNCTION PANEL - Abnormal; Notable for the following components:    Alkaline Phosphatase 148 (*)     All other components within normal limits   GLOMERULAR FILTRATION RATE, ESTIMATED - Abnormal; Notable for the following components:    Est, Glom Filt Rate 76 (*)     All other components within normal limits   IRON   IRON BINDING CAPACITY   IRON SATURATION   FERRITIN   LIPASE   PROTIME-INR   BLOOD OCCULT STOOL SCREEN #1   ANION GAP   OSMOLALITY   SCAN OF BLOOD SMEAR   TYPE AND SCREEN       EMERGENCY DEPARTMENT COURSE:   Vitals:    Vitals:    06/10/19 0905 06/10/19 0945   BP: (!) 155/84 (!) 152/79   Pulse: 107 104   Resp: 20 24   Temp: 98.6 °F (37 °C)    TempSrc: Oral    SpO2: 92% 97%   Weight: 141 lb (64 kg)    Height: 5' 5\" (1.651 m)        9:10 AM: The patient was seen and evaluated in a timely fashion. MDM:  Patient has no rectal bleeding on examination. Patient's stool was also negative for any blood. Patient's iron studies were also reassuring along with his hemoglobin. Hemoglobin today is 16.1. Patient white count is elevated but patient has elevation of white count in the past.  Patient will be discharged home on Protonix. I talked to Dr. Kaushal Ferris office and patient was advised to have a EGD done. It was also advised to the long term  officers to try to take him off aspirin. Her doctor bring the patient back to the emergency department if symptoms get worse. CRITICAL CARE:   None     CONSULTS:      PROCEDURES:  None     FINAL IMPRESSION      1.  Black stools          DISPOSITION/PLAN   Discharge    PATIENT REFERRED TO:  Miguel Meyer MD  [address] [facility]  [address]  [phone]    Go in 1 day      Edson Moran, [address] [phone]    Call in 1 day      [address] [address],[address] [facility]. [address] [facility]  Go in 1 day  If symptoms worsen      DISCHARGE MEDICATIONS:  New Prescriptions    PANTOPRAZOLE (PROTONIX) 20 MG TABLET    Take 2 tablets by mouth daily for 30 doses       (Please note that portions of this note were completed with a voice recognition program.Efforts were made to edit thedictations but occasionally words are mis-transcribed.)    Scribe:  Jorge Gauthier 6/10/19 9:10 AM Scribing forand in the presence Adri Huber MD.    Signed by: Pedro Kim, 06/10/19 11:10 AM    Provider:  I personally performed the services described in the documentation, reviewed and edited the documentation which was dictated to the scribe in my presence, and it accurately records mywords and actions.     Daisy Paz MD 6/10/19 11:10 AM                  Daisy Paz MD  06/10/19 4322

## 2019-06-11 ENCOUNTER — APPOINTMENT (OUTPATIENT)
Dept: CT IMAGING | Age: 59
End: 2019-06-11
Payer: COMMERCIAL

## 2019-06-11 ENCOUNTER — HOSPITAL ENCOUNTER (EMERGENCY)
Age: 59
Discharge: ANOTHER HOSPITAL | End: 2019-06-12
Attending: EMERGENCY MEDICINE
Payer: COMMERCIAL

## 2019-06-11 DIAGNOSIS — R41.82 ALTERED MENTAL STATUS, UNSPECIFIED ALTERED MENTAL STATUS TYPE: Primary | ICD-10-CM

## 2019-06-11 LAB
ALBUMIN SERPL-MCNC: 4.1 G/DL (ref 3.5–5.1)
ALP BLD-CCNC: 110 U/L (ref 38–126)
ALT SERPL-CCNC: 13 U/L (ref 11–66)
AMMONIA: 30 UMOL/L (ref 11–60)
AMPHETAMINE+METHAMPHETAMINE URINE SCREEN: NEGATIVE
ANION GAP SERPL CALCULATED.3IONS-SCNC: 16 MEQ/L (ref 8–16)
AST SERPL-CCNC: 17 U/L (ref 5–40)
BACTERIA: ABNORMAL /HPF
BARBITURATE QUANTITATIVE URINE: NEGATIVE
BASOPHILS # BLD: 0.6 %
BASOPHILS ABSOLUTE: 0.1 THOU/MM3 (ref 0–0.1)
BENZODIAZEPINE QUANTITATIVE URINE: NEGATIVE
BILIRUB SERPL-MCNC: 0.2 MG/DL (ref 0.3–1.2)
BILIRUBIN URINE: ABNORMAL
BLOOD, URINE: ABNORMAL
BUN BLDV-MCNC: 47 MG/DL (ref 7–22)
CALCIUM SERPL-MCNC: 10.9 MG/DL (ref 8.5–10.5)
CALCIUM SERPL-MCNC: 11.2 MG/DL (ref 8.5–10.5)
CANNABINOID QUANTITATIVE URINE: NEGATIVE
CASTS 2: ABNORMAL /LPF
CASTS UA: ABNORMAL /LPF
CHARACTER, URINE: CLEAR
CHLORIDE BLD-SCNC: 90 MEQ/L (ref 98–111)
CO2: 28 MEQ/L (ref 23–33)
COCAINE METABOLITE QUANTITATIVE URINE: NEGATIVE
COLOR: YELLOW
CREAT SERPL-MCNC: 0.8 MG/DL (ref 0.4–1.2)
CRYSTALS, UA: ABNORMAL
EKG ATRIAL RATE: 105 BPM
EKG P AXIS: 56 DEGREES
EKG P-R INTERVAL: 128 MS
EKG Q-T INTERVAL: 452 MS
EKG QRS DURATION: 76 MS
EKG QTC CALCULATION (BAZETT): 597 MS
EKG R AXIS: 65 DEGREES
EKG T AXIS: 78 DEGREES
EKG VENTRICULAR RATE: 105 BPM
EOSINOPHIL # BLD: 0.2 %
EOSINOPHILS ABSOLUTE: 0 THOU/MM3 (ref 0–0.4)
EPITHELIAL CELLS, UA: ABNORMAL /HPF
ERYTHROCYTE [DISTWIDTH] IN BLOOD BY AUTOMATED COUNT: 15.3 % (ref 11.5–14.5)
ERYTHROCYTE [DISTWIDTH] IN BLOOD BY AUTOMATED COUNT: 46.5 FL (ref 35–45)
ETHYL ALCOHOL, SERUM: < 0.01 %
GFR SERPL CREATININE-BSD FRML MDRD: > 90 ML/MIN/1.73M2
GLUCOSE BLD-MCNC: 118 MG/DL (ref 70–108)
GLUCOSE URINE: 500 MG/DL
HCT VFR BLD CALC: 39.8 % (ref 42–52)
HEMOGLOBIN: 13.4 GM/DL (ref 14–18)
ICTOTEST: NEGATIVE
IMMATURE GRANS (ABS): 0.15 THOU/MM3 (ref 0–0.07)
IMMATURE GRANULOCYTES: 0.8 %
KETONES, URINE: NEGATIVE
LEUKOCYTE ESTERASE, URINE: NEGATIVE
LYMPHOCYTES # BLD: 12.3 %
LYMPHOCYTES ABSOLUTE: 2.5 THOU/MM3 (ref 1–4.8)
MAGNESIUM: 1.5 MG/DL (ref 1.6–2.4)
MCH RBC QN AUTO: 28.6 PG (ref 26–33)
MCHC RBC AUTO-ENTMCNC: 33.7 GM/DL (ref 32.2–35.5)
MCV RBC AUTO: 85 FL (ref 80–94)
MISCELLANEOUS 2: ABNORMAL
MONOCYTES # BLD: 8.6 %
MONOCYTES ABSOLUTE: 1.7 THOU/MM3 (ref 0.4–1.3)
NITRITE, URINE: NEGATIVE
NUCLEATED RED BLOOD CELLS: 0 /100 WBC
OPIATES, URINE: NEGATIVE
OSMOLALITY CALCULATION: 281.6 MOSMOL/KG (ref 275–300)
OXYCODONE: NEGATIVE
PH UA: 6 (ref 5–9)
PHENCYCLIDINE QUANTITATIVE URINE: NEGATIVE
PLATELET # BLD: 366 THOU/MM3 (ref 130–400)
PMV BLD AUTO: 11.1 FL (ref 9.4–12.4)
POTASSIUM SERPL-SCNC: 3.7 MEQ/L (ref 3.5–5.2)
PROTEIN UA: 100
RBC # BLD: 4.68 MILL/MM3 (ref 4.7–6.1)
RBC URINE: ABNORMAL /HPF
RENAL EPITHELIAL, UA: ABNORMAL
SEG NEUTROPHILS: 77.5 %
SEGMENTED NEUTROPHILS ABSOLUTE COUNT: 15.5 THOU/MM3 (ref 1.8–7.7)
SODIUM BLD-SCNC: 134 MEQ/L (ref 135–145)
SPECIFIC GRAVITY, URINE: 1.03 (ref 1–1.03)
TOTAL PROTEIN: 7 G/DL (ref 6.1–8)
TROPONIN T: < 0.01 NG/ML
UROBILINOGEN, URINE: 0.2 EU/DL (ref 0–1)
WBC # BLD: 20 THOU/MM3 (ref 4.8–10.8)
WBC UA: ABNORMAL /HPF
YEAST: ABNORMAL

## 2019-06-11 PROCEDURE — 85025 COMPLETE CBC W/AUTO DIFF WBC: CPT

## 2019-06-11 PROCEDURE — 83735 ASSAY OF MAGNESIUM: CPT

## 2019-06-11 PROCEDURE — 93005 ELECTROCARDIOGRAM TRACING: CPT | Performed by: EMERGENCY MEDICINE

## 2019-06-11 PROCEDURE — G0480 DRUG TEST DEF 1-7 CLASSES: HCPCS

## 2019-06-11 PROCEDURE — 80307 DRUG TEST PRSMV CHEM ANLYZR: CPT

## 2019-06-11 PROCEDURE — 36415 COLL VENOUS BLD VENIPUNCTURE: CPT

## 2019-06-11 PROCEDURE — 2709999900 HC NON-CHARGEABLE SUPPLY

## 2019-06-11 PROCEDURE — 99285 EMERGENCY DEPT VISIT HI MDM: CPT

## 2019-06-11 PROCEDURE — 81001 URINALYSIS AUTO W/SCOPE: CPT

## 2019-06-11 PROCEDURE — 80053 COMPREHEN METABOLIC PANEL: CPT

## 2019-06-11 PROCEDURE — 70450 CT HEAD/BRAIN W/O DYE: CPT

## 2019-06-11 PROCEDURE — 82140 ASSAY OF AMMONIA: CPT

## 2019-06-11 PROCEDURE — 93010 ELECTROCARDIOGRAM REPORT: CPT | Performed by: NUCLEAR MEDICINE

## 2019-06-11 PROCEDURE — 84484 ASSAY OF TROPONIN QUANT: CPT

## 2019-06-11 PROCEDURE — 82310 ASSAY OF CALCIUM: CPT

## 2019-06-11 RX ORDER — MAGNESIUM HYDROXIDE/ALUMINUM HYDROXICE/SIMETHICONE 120; 1200; 1200 MG/30ML; MG/30ML; MG/30ML
30 SUSPENSION ORAL 2 TIMES DAILY
COMMUNITY

## 2019-06-11 ASSESSMENT — PAIN SCALES - GENERAL: PAINLEVEL_OUTOF10: 0

## 2019-06-11 ASSESSMENT — PAIN DESCRIPTION - PAIN TYPE: TYPE: ACUTE PAIN

## 2019-06-11 NOTE — ED NOTES
Pt. Resting in bed with even and unlabored respirations. Pt. States denies any pain and states his pain is a 0/10 at this time. Pt. Updated about plan of care and treatment. Pt. Has no further concerns, questions or needs at this time. Call light within reach.         Rozina Suggs RN  06/11/19 7319

## 2019-06-11 NOTE — ED PROVIDER NOTES
mouth daily    BUDESONIDE-FORMOTEROL (SYMBICORT) 80-4.5 MCG/ACT AERO    Inhale 2 puffs into the lungs 2 times daily    CALCIUM CARBONATE (TUMS) 500 MG CHEWABLE TABLET    Take 1 tablet by mouth 4 times daily as needed for Heartburn 1-2 daily QID PRN    FERROUS SULFATE 325 (65 FE) MG TABLET    Take 325 mg by mouth daily (with breakfast)    INSULIN NPH (HUMULIN N;NOVOLIN N) 100 UNIT/ML INJECTION VIAL    Inject 19 Units into the skin 2 times daily (before meals)     INSULIN NPH (HUMULIN N;NOVOLIN N) 100 UNIT/ML INJECTION VIAL    Inject 10 Units into the skin nightly Indications: with dinner    INSULIN REGULAR (HUMULIN R;NOVOLIN R) 100 UNIT/ML INJECTION    Inject 8 Units into the skin See Admin Instructions Indications: 8 units before meals three times a day. Sliding scale  Greater than 401 call MD    IPRATROPIUM (ATROVENT HFA) 17 MCG/ACT INHALER    Inhale 2 puffs into the lungs every 6 hours    LAMOTRIGINE (LAMICTAL) 150 MG TABLET    Take 150 mg by mouth daily    METFORMIN (GLUCOPHAGE) 500 MG TABLET    Take 1,000 mg by mouth 2 times daily (with meals)        ALLERGIES     is allergic to sulfa antibiotics and sulphasomidine. FAMILY HISTORY     has no family status information on file. family history is not on file. SOCIAL HISTORY      reports that he has quit smoking. His smoking use included cigarettes. He smoked 1.00 pack per day. He does not have any smokeless tobacco history on file. He reports that he does not drink alcohol or use drugs. PHYSICAL EXAM     INITIAL VITALS:  height is 5' 5\" (1.651 m) and weight is 150 lb (68 kg). His temperature is 97.9 °F (36.6 °C). His blood pressure is 144/90 (abnormal) and his pulse is 99. His respiration is 18 and oxygen saturation is 95%.     Constitutional: ill appearing  Eyes:  Pupils are equal and reactive, extraocular muscles intact   HENT:  Atraumatic appearing  oropharynx moist  Neck- normal range of motion, supple   Respiratory:  No wheezing, rhonchi or

## 2019-06-12 VITALS
HEIGHT: 65 IN | OXYGEN SATURATION: 95 % | DIASTOLIC BLOOD PRESSURE: 62 MMHG | RESPIRATION RATE: 18 BRPM | TEMPERATURE: 97.9 F | BODY MASS INDEX: 24.99 KG/M2 | WEIGHT: 150 LBS | HEART RATE: 78 BPM | SYSTOLIC BLOOD PRESSURE: 117 MMHG

## 2019-06-12 NOTE — ED NOTES
Pt resting in bed, asleep. Resp regular. Denies needs. Call light in reach.       Michelle Smart RN  06/12/19 6227

## 2019-06-12 NOTE — ED NOTES
Pt. Resting in bed with even and unlabored respirations. Pt. Denies any pain at this time. Pt. Remains confused, calm and cooperative. Pt. Updated about plan of care and treatment. officers at bedside. Pt. Has no further concerns, questions or needs at this time. Call light within reach.         Shady Spencer RN  06/11/19 9062

## 2019-06-12 NOTE — ED NOTES
Report given to [facility] [place].       Amy Garcia, RN  06/12/19 8553

## 2019-06-12 NOTE — ED NOTES
Pt. Resting in bed with even and unlabored respirations. Pt. Is resting with his eyes closed at this time. Pt. Has no further concerns, questions or needs at this time. Call light within reach.         Ben Vinson RN  06/12/19 3942

## 2019-06-12 NOTE — ED NOTES
Pt. Resting in bed with even and unlabored respirations. Pt. States pain is a 0/10 at this time. Pt. Refused oral fluids or snack. Pt. Repositioned in bed. Pt. is still confused but calm and cooperative. Pt. Updated about plan of care and treatment. . Pt. Has no further concerns, questions or needs at this time. Call light within reach.         Momo Martinez RN  06/11/19 526 [facility], RN  06/11/19 8537

## 2019-06-12 NOTE — ED NOTES
[facility] called and states would be about an hour until transport arrives. Will update pt and guards.       Robert Valencia RN  06/12/19 7060

## 2019-06-12 NOTE — ED NOTES
Pt asleep in bed. Resp regular. Guards at side. Call light in reach.       Steven Knight, [facility]  06/12/19 6800

## 2019-06-12 NOTE — ED NOTES
Pt. Resting in bed with even and unlabored respirations. Pt. Still denies any pain and states pain is a 0/10 at this time. Pt. Updated about plan of care and treatment. Family at bedside. Pt. Has no further concerns, questions or needs at this time. Call light within reach.         Rozina Suggs RN  06/11/19 2007

## 2019-06-12 NOTE — ED NOTES
Pt. Resting in bed with even and unlabored respirations. Pt. States pain is a 0/10 at this time. Pt. Offered respositioning, oral fluids and a snack but refused. Pt. Updated about plan of care and treatment. Officers at bedside. Pt. Has no further concerns, questions or needs at this time. Call light within reach.         Domingo Santiago RN  06/11/19 6263

## 2019-10-22 ENCOUNTER — APPOINTMENT (OUTPATIENT)
Dept: CT IMAGING | Age: 59
End: 2019-10-22
Payer: COMMERCIAL

## 2019-10-22 ENCOUNTER — HOSPITAL ENCOUNTER (EMERGENCY)
Age: 59
Discharge: ANOTHER HOSPITAL | End: 2019-10-22
Payer: COMMERCIAL

## 2019-10-22 VITALS
HEART RATE: 101 BPM | BODY MASS INDEX: 24.75 KG/M2 | DIASTOLIC BLOOD PRESSURE: 83 MMHG | TEMPERATURE: 98.1 F | RESPIRATION RATE: 16 BRPM | WEIGHT: 145 LBS | SYSTOLIC BLOOD PRESSURE: 188 MMHG | OXYGEN SATURATION: 97 % | HEIGHT: 64 IN

## 2019-10-22 DIAGNOSIS — R10.9 ABDOMINAL PAIN, UNSPECIFIED ABDOMINAL LOCATION: ICD-10-CM

## 2019-10-22 DIAGNOSIS — K92.0 COFFEE GROUND EMESIS: ICD-10-CM

## 2019-10-22 DIAGNOSIS — K59.00 CONSTIPATION, UNSPECIFIED CONSTIPATION TYPE: ICD-10-CM

## 2019-10-22 DIAGNOSIS — R11.2 NAUSEA AND VOMITING, INTRACTABILITY OF VOMITING NOT SPECIFIED, UNSPECIFIED VOMITING TYPE: Primary | ICD-10-CM

## 2019-10-22 DIAGNOSIS — D72.829 LEUKOCYTOSIS, UNSPECIFIED TYPE: ICD-10-CM

## 2019-10-22 LAB
ALBUMIN SERPL-MCNC: 4.4 G/DL (ref 3.5–5.1)
ALP BLD-CCNC: 119 U/L (ref 38–126)
ALT SERPL-CCNC: 15 U/L (ref 11–66)
AMPHETAMINE+METHAMPHETAMINE URINE SCREEN: NEGATIVE
ANION GAP SERPL CALCULATED.3IONS-SCNC: 15 MEQ/L (ref 8–16)
AST SERPL-CCNC: 24 U/L (ref 5–40)
BACTERIA: ABNORMAL /HPF
BARBITURATE QUANTITATIVE URINE: NEGATIVE
BASOPHILS # BLD: 0 %
BASOPHILS ABSOLUTE: 0 THOU/MM3 (ref 0–0.1)
BENZODIAZEPINE QUANTITATIVE URINE: NEGATIVE
BILIRUB SERPL-MCNC: 0.3 MG/DL (ref 0.3–1.2)
BILIRUBIN DIRECT: < 0.2 MG/DL (ref 0–0.3)
BILIRUBIN URINE: NEGATIVE
BLOOD, URINE: NEGATIVE
BUN BLDV-MCNC: 17 MG/DL (ref 7–22)
CALCIUM SERPL-MCNC: 11.1 MG/DL (ref 8.5–10.5)
CANNABINOID QUANTITATIVE URINE: NEGATIVE
CASTS 2: ABNORMAL /LPF
CASTS UA: ABNORMAL /LPF
CHARACTER, URINE: CLEAR
CHLORIDE BLD-SCNC: 91 MEQ/L (ref 98–111)
CO2: 26 MEQ/L (ref 23–33)
COCAINE METABOLITE QUANTITATIVE URINE: NEGATIVE
COLOR: YELLOW
CREAT SERPL-MCNC: 0.7 MG/DL (ref 0.4–1.2)
CRYSTALS, UA: ABNORMAL
DIFFERENTIAL, MANUAL: NORMAL
EOSINOPHIL # BLD: 0 %
EOSINOPHILS ABSOLUTE: 0 THOU/MM3 (ref 0–0.4)
EPITHELIAL CELLS, UA: ABNORMAL /HPF
ERYTHROCYTE [DISTWIDTH] IN BLOOD BY AUTOMATED COUNT: 12.9 % (ref 11.5–14.5)
ERYTHROCYTE [DISTWIDTH] IN BLOOD BY AUTOMATED COUNT: 42.1 FL (ref 35–45)
GFR SERPL CREATININE-BSD FRML MDRD: > 90 ML/MIN/1.73M2
GLUCOSE BLD-MCNC: 143 MG/DL (ref 70–108)
GLUCOSE URINE: NEGATIVE MG/DL
HCT VFR BLD CALC: 43 % (ref 42–52)
HEMOGLOBIN: 14.9 GM/DL (ref 14–18)
KETONES, URINE: NEGATIVE
LACTIC ACID, SEPSIS: 1.7 MMOL/L (ref 0.5–1.9)
LEUKOCYTE ESTERASE, URINE: NEGATIVE
LIPASE: 8.7 U/L (ref 5.6–51.3)
LYMPHOCYTES # BLD: 4 %
LYMPHOCYTES ABSOLUTE: 1 THOU/MM3 (ref 1–4.8)
MAGNESIUM: 1.7 MG/DL (ref 1.6–2.4)
MCH RBC QN AUTO: 31.1 PG (ref 26–33)
MCHC RBC AUTO-ENTMCNC: 34.7 GM/DL (ref 32.2–35.5)
MCV RBC AUTO: 89.8 FL (ref 80–94)
MISCELLANEOUS 2: ABNORMAL
MONOCYTES # BLD: 5 %
MONOCYTES ABSOLUTE: 1.2 THOU/MM3 (ref 0.4–1.3)
NITRITE, URINE: NEGATIVE
NUCLEATED RED BLOOD CELLS: 0 /100 WBC
OPIATES, URINE: NEGATIVE
OSMOLALITY CALCULATION: 268.5 MOSMOL/KG (ref 275–300)
OXYCODONE: NEGATIVE
PH UA: 8.5 (ref 5–9)
PHENCYCLIDINE QUANTITATIVE URINE: NEGATIVE
PLATELET # BLD: 382 THOU/MM3 (ref 130–400)
PLATELET ESTIMATE: ADEQUATE
PMV BLD AUTO: 9.5 FL (ref 9.4–12.4)
POTASSIUM SERPL-SCNC: 4.9 MEQ/L (ref 3.5–5.2)
PROTEIN UA: 100
RBC # BLD: 4.79 MILL/MM3 (ref 4.7–6.1)
RBC URINE: ABNORMAL /HPF
REASON FOR REJECTION: NORMAL
REJECTED TEST: NORMAL
RENAL EPITHELIAL, UA: ABNORMAL
SEG NEUTROPHILS: 91 %
SEGMENTED NEUTROPHILS ABSOLUTE COUNT: 22.2 THOU/MM3 (ref 1.8–7.7)
SODIUM BLD-SCNC: 132 MEQ/L (ref 135–145)
SPECIFIC GRAVITY, URINE: > 1.03 (ref 1–1.03)
TOTAL PROTEIN: 7 G/DL (ref 6.1–8)
UROBILINOGEN, URINE: 0.2 EU/DL (ref 0–1)
WBC # BLD: 24.4 THOU/MM3 (ref 4.8–10.8)
WBC UA: ABNORMAL /HPF
YEAST: ABNORMAL

## 2019-10-22 PROCEDURE — 6360000004 HC RX CONTRAST MEDICATION: Performed by: PHYSICIAN ASSISTANT

## 2019-10-22 PROCEDURE — 96375 TX/PRO/DX INJ NEW DRUG ADDON: CPT

## 2019-10-22 PROCEDURE — 87040 BLOOD CULTURE FOR BACTERIA: CPT

## 2019-10-22 PROCEDURE — 96376 TX/PRO/DX INJ SAME DRUG ADON: CPT

## 2019-10-22 PROCEDURE — 80053 COMPREHEN METABOLIC PANEL: CPT

## 2019-10-22 PROCEDURE — 6360000002 HC RX W HCPCS: Performed by: EMERGENCY MEDICINE

## 2019-10-22 PROCEDURE — C9113 INJ PANTOPRAZOLE SODIUM, VIA: HCPCS | Performed by: PHYSICIAN ASSISTANT

## 2019-10-22 PROCEDURE — 2580000003 HC RX 258: Performed by: PHYSICIAN ASSISTANT

## 2019-10-22 PROCEDURE — 81001 URINALYSIS AUTO W/SCOPE: CPT

## 2019-10-22 PROCEDURE — 6370000000 HC RX 637 (ALT 250 FOR IP): Performed by: PHYSICIAN ASSISTANT

## 2019-10-22 PROCEDURE — 6360000002 HC RX W HCPCS: Performed by: PHYSICIAN ASSISTANT

## 2019-10-22 PROCEDURE — 74177 CT ABD & PELVIS W/CONTRAST: CPT

## 2019-10-22 PROCEDURE — 80307 DRUG TEST PRSMV CHEM ANLYZR: CPT

## 2019-10-22 PROCEDURE — 82248 BILIRUBIN DIRECT: CPT

## 2019-10-22 PROCEDURE — 83690 ASSAY OF LIPASE: CPT

## 2019-10-22 PROCEDURE — 99285 EMERGENCY DEPT VISIT HI MDM: CPT

## 2019-10-22 PROCEDURE — 36415 COLL VENOUS BLD VENIPUNCTURE: CPT

## 2019-10-22 PROCEDURE — 85025 COMPLETE CBC W/AUTO DIFF WBC: CPT

## 2019-10-22 PROCEDURE — 83735 ASSAY OF MAGNESIUM: CPT

## 2019-10-22 PROCEDURE — 96365 THER/PROPH/DIAG IV INF INIT: CPT

## 2019-10-22 PROCEDURE — 83605 ASSAY OF LACTIC ACID: CPT

## 2019-10-22 RX ORDER — DICYCLOMINE HYDROCHLORIDE 10 MG/1
10 CAPSULE ORAL EVERY 6 HOURS PRN
Qty: 20 CAPSULE | Refills: 0 | Status: SHIPPED | OUTPATIENT
Start: 2019-10-22

## 2019-10-22 RX ORDER — ONDANSETRON 2 MG/ML
4 INJECTION INTRAMUSCULAR; INTRAVENOUS ONCE
Status: COMPLETED | OUTPATIENT
Start: 2019-10-22 | End: 2019-10-22

## 2019-10-22 RX ORDER — MORPHINE SULFATE 2 MG/ML
2 INJECTION, SOLUTION INTRAMUSCULAR; INTRAVENOUS ONCE
Status: COMPLETED | OUTPATIENT
Start: 2019-10-22 | End: 2019-10-22

## 2019-10-22 RX ORDER — 0.9 % SODIUM CHLORIDE 0.9 %
1000 INTRAVENOUS SOLUTION INTRAVENOUS ONCE
Status: COMPLETED | OUTPATIENT
Start: 2019-10-22 | End: 2019-10-22

## 2019-10-22 RX ORDER — LISINOPRIL 5 MG/1
5 TABLET ORAL DAILY
COMMUNITY

## 2019-10-22 RX ORDER — POLYETHYLENE GLYCOL 3350 17 G/17G
17 POWDER, FOR SOLUTION ORAL DAILY
Qty: 1 BOTTLE | Refills: 0 | Status: SHIPPED | OUTPATIENT
Start: 2019-10-22 | End: 2019-10-29

## 2019-10-22 RX ORDER — KETOROLAC TROMETHAMINE 30 MG/ML
30 INJECTION, SOLUTION INTRAMUSCULAR; INTRAVENOUS ONCE
Status: COMPLETED | OUTPATIENT
Start: 2019-10-22 | End: 2019-10-22

## 2019-10-22 RX ORDER — PANTOPRAZOLE SODIUM 20 MG/1
40 TABLET, DELAYED RELEASE ORAL DAILY
Qty: 60 TABLET | Refills: 0 | Status: SHIPPED | OUTPATIENT
Start: 2019-10-22 | End: 2019-11-21

## 2019-10-22 RX ORDER — DICYCLOMINE HYDROCHLORIDE 10 MG/1
10 CAPSULE ORAL ONCE
Status: COMPLETED | OUTPATIENT
Start: 2019-10-22 | End: 2019-10-22

## 2019-10-22 RX ORDER — LOXAPINE SUCCINATE 10 MG/1
10 TABLET ORAL 3 TIMES DAILY
COMMUNITY

## 2019-10-22 RX ORDER — FAMOTIDINE 20 MG/1
20 TABLET, FILM COATED ORAL NIGHTLY PRN
COMMUNITY

## 2019-10-22 RX ORDER — ONDANSETRON 4 MG/1
4 TABLET, ORALLY DISINTEGRATING ORAL EVERY 8 HOURS PRN
Qty: 20 TABLET | Refills: 0 | Status: SHIPPED | OUTPATIENT
Start: 2019-10-22

## 2019-10-22 RX ADMIN — DICYCLOMINE HYDROCHLORIDE 10 MG: 10 CAPSULE ORAL at 15:20

## 2019-10-22 RX ADMIN — ONDANSETRON 4 MG: 2 INJECTION INTRAMUSCULAR; INTRAVENOUS at 20:19

## 2019-10-22 RX ADMIN — IOPAMIDOL 80 ML: 755 INJECTION, SOLUTION INTRAVENOUS at 17:18

## 2019-10-22 RX ADMIN — KETOROLAC TROMETHAMINE 30 MG: 30 INJECTION, SOLUTION INTRAMUSCULAR at 15:18

## 2019-10-22 RX ADMIN — MORPHINE SULFATE 2 MG: 2 INJECTION, SOLUTION INTRAMUSCULAR; INTRAVENOUS at 19:58

## 2019-10-22 RX ADMIN — MAGNESIUM CITRATE 296 ML: 1.75 LIQUID ORAL at 20:06

## 2019-10-22 RX ADMIN — SODIUM CHLORIDE 80 MG: 9 INJECTION, SOLUTION INTRAVENOUS at 21:35

## 2019-10-22 RX ADMIN — ONDANSETRON 4 MG: 2 INJECTION INTRAMUSCULAR; INTRAVENOUS at 15:18

## 2019-10-22 RX ADMIN — ONDANSETRON HYDROCHLORIDE 4 MG: 4 INJECTION, SOLUTION INTRAMUSCULAR; INTRAVENOUS at 22:31

## 2019-10-22 RX ADMIN — SODIUM CHLORIDE 1000 ML: 9 INJECTION, SOLUTION INTRAVENOUS at 15:18

## 2019-10-22 RX ADMIN — SODIUM CHLORIDE 8 MG/HR: 9 INJECTION, SOLUTION INTRAVENOUS at 21:35

## 2019-10-22 ASSESSMENT — ENCOUNTER SYMPTOMS
BACK PAIN: 0
VOMITING: 1
COLOR CHANGE: 0
CONSTIPATION: 0
DIARRHEA: 0
ABDOMINAL PAIN: 1
NAUSEA: 1
COUGH: 0

## 2019-10-22 ASSESSMENT — PAIN SCALES - GENERAL: PAINLEVEL_OUTOF10: 10

## 2019-10-25 ASSESSMENT — ENCOUNTER SYMPTOMS: BLOOD IN STOOL: 1

## 2019-10-28 LAB
BLOOD CULTURE, ROUTINE: NORMAL
BLOOD CULTURE, ROUTINE: NORMAL

## 2019-11-28 ENCOUNTER — APPOINTMENT (OUTPATIENT)
Dept: CT IMAGING | Age: 59
End: 2019-11-28
Payer: COMMERCIAL

## 2019-11-28 ENCOUNTER — HOSPITAL ENCOUNTER (EMERGENCY)
Age: 59
Discharge: ANOTHER HOSPITAL | End: 2019-11-28
Payer: COMMERCIAL

## 2019-11-28 ENCOUNTER — APPOINTMENT (OUTPATIENT)
Dept: GENERAL RADIOLOGY | Age: 59
End: 2019-11-28
Payer: COMMERCIAL

## 2019-11-28 VITALS
TEMPERATURE: 97.4 F | DIASTOLIC BLOOD PRESSURE: 79 MMHG | RESPIRATION RATE: 18 BRPM | BODY MASS INDEX: 25.75 KG/M2 | OXYGEN SATURATION: 97 % | HEART RATE: 100 BPM | WEIGHT: 150 LBS | SYSTOLIC BLOOD PRESSURE: 178 MMHG

## 2019-11-28 DIAGNOSIS — R41.82 ALTERED MENTAL STATUS, UNSPECIFIED ALTERED MENTAL STATUS TYPE: Primary | ICD-10-CM

## 2019-11-28 LAB
ABO: NORMAL
ACETAMINOPHEN LEVEL: < 5 UG/ML (ref 0–20)
ALBUMIN SERPL-MCNC: 4.5 G/DL (ref 3.5–5.1)
ALP BLD-CCNC: 112 U/L (ref 38–126)
ALT SERPL-CCNC: 100 U/L (ref 11–66)
AMMONIA: 26 UMOL/L (ref 11–60)
AMPHETAMINE+METHAMPHETAMINE URINE SCREEN: NEGATIVE
ANION GAP SERPL CALCULATED.3IONS-SCNC: 18 MEQ/L (ref 8–16)
ANTIBODY SCREEN: NORMAL
AST SERPL-CCNC: 469 U/L (ref 5–40)
BACTERIA: ABNORMAL /HPF
BARBITURATE QUANTITATIVE URINE: NEGATIVE
BASOPHILS # BLD: 0 %
BASOPHILS ABSOLUTE: 0 THOU/MM3 (ref 0–0.1)
BENZODIAZEPINE QUANTITATIVE URINE: NEGATIVE
BILIRUB SERPL-MCNC: 0.6 MG/DL (ref 0.3–1.2)
BILIRUBIN DIRECT: < 0.2 MG/DL (ref 0–0.3)
BILIRUBIN URINE: NEGATIVE
BLOOD, URINE: ABNORMAL
BUN BLDV-MCNC: 20 MG/DL (ref 7–22)
CALCIUM SERPL-MCNC: 10.5 MG/DL (ref 8.5–10.5)
CANNABINOID QUANTITATIVE URINE: NEGATIVE
CASTS 2: ABNORMAL /LPF
CASTS UA: ABNORMAL /LPF
CHARACTER, URINE: CLEAR
CHLORIDE BLD-SCNC: 92 MEQ/L (ref 98–111)
CO2: 24 MEQ/L (ref 23–33)
COCAINE METABOLITE QUANTITATIVE URINE: NEGATIVE
COLOR: YELLOW
CREAT SERPL-MCNC: 0.8 MG/DL (ref 0.4–1.2)
CRYSTALS, UA: ABNORMAL
DIFFERENTIAL, MANUAL: NORMAL
EKG ATRIAL RATE: 101 BPM
EKG P AXIS: 64 DEGREES
EKG P-R INTERVAL: 138 MS
EKG Q-T INTERVAL: 328 MS
EKG QRS DURATION: 76 MS
EKG QTC CALCULATION (BAZETT): 425 MS
EKG R AXIS: 60 DEGREES
EKG T AXIS: 63 DEGREES
EKG VENTRICULAR RATE: 101 BPM
EOSINOPHIL # BLD: 0 %
EOSINOPHILS ABSOLUTE: 0 THOU/MM3 (ref 0–0.4)
EPITHELIAL CELLS, UA: ABNORMAL /HPF
ERYTHROCYTE [DISTWIDTH] IN BLOOD BY AUTOMATED COUNT: 13.3 % (ref 11.5–14.5)
ERYTHROCYTE [DISTWIDTH] IN BLOOD BY AUTOMATED COUNT: 44.9 FL (ref 35–45)
ETHYL ALCOHOL, SERUM: < 0.01 %
GFR SERPL CREATININE-BSD FRML MDRD: > 90 ML/MIN/1.73M2
GLUCOSE BLD-MCNC: 204 MG/DL (ref 70–108)
GLUCOSE URINE: NEGATIVE MG/DL
HAV IGM SER IA-ACNC: NEGATIVE
HCT VFR BLD CALC: 38.4 % (ref 42–52)
HEMOCCULT STL QL: NEGATIVE
HEMOGLOBIN: 12.9 GM/DL (ref 14–18)
HEPATITIS B CORE IGM ANTIBODY: NEGATIVE
HEPATITIS B SURFACE ANTIGEN: NEGATIVE
HEPATITIS C ANTIBODY: NEGATIVE
KETONES, URINE: ABNORMAL
LACTIC ACID, SEPSIS: 1.5 MMOL/L (ref 0.5–1.9)
LACTIC ACID, SEPSIS: 2.9 MMOL/L (ref 0.5–1.9)
LEUKOCYTE ESTERASE, URINE: NEGATIVE
LIPASE: 4.7 U/L (ref 5.6–51.3)
LYMPHOCYTES # BLD: 9 %
LYMPHOCYTES ABSOLUTE: 1.7 THOU/MM3 (ref 1–4.8)
MAGNESIUM: 1.8 MG/DL (ref 1.6–2.4)
MCH RBC QN AUTO: 30.7 PG (ref 26–33)
MCHC RBC AUTO-ENTMCNC: 33.6 GM/DL (ref 32.2–35.5)
MCV RBC AUTO: 91.4 FL (ref 80–94)
MISCELLANEOUS 2: ABNORMAL
MONOCYTES # BLD: 5 %
MONOCYTES ABSOLUTE: 1 THOU/MM3 (ref 0.4–1.3)
NITRITE, URINE: NEGATIVE
NUCLEATED RED BLOOD CELLS: 0 /100 WBC
OPIATES, URINE: NEGATIVE
OSMOLALITY CALCULATION: 276.7 MOSMOL/KG (ref 275–300)
OXYCODONE: NEGATIVE
PH UA: 5 (ref 5–9)
PHENCYCLIDINE QUANTITATIVE URINE: NEGATIVE
PLATELET # BLD: 471 THOU/MM3 (ref 130–400)
PLATELET ESTIMATE: ABNORMAL
PMV BLD AUTO: 9.8 FL (ref 9.4–12.4)
POTASSIUM SERPL-SCNC: 5.4 MEQ/L (ref 3.5–5.2)
PRO-BNP: 319.2 PG/ML (ref 0–900)
PROTEIN UA: 100
RBC # BLD: 4.2 MILL/MM3 (ref 4.7–6.1)
RBC URINE: ABNORMAL /HPF
RENAL EPITHELIAL, UA: ABNORMAL
RH FACTOR: NORMAL
SALICYLATE, SERUM: < 0.3 MG/DL (ref 2–10)
SEG NEUTROPHILS: 86 %
SEGMENTED NEUTROPHILS ABSOLUTE COUNT: 16.4 THOU/MM3 (ref 1.8–7.7)
SODIUM BLD-SCNC: 134 MEQ/L (ref 135–145)
SPECIFIC GRAVITY, URINE: 1.02 (ref 1–1.03)
TOTAL PROTEIN: 6.9 G/DL (ref 6.1–8)
TROPONIN T: 0.02 NG/ML
TROPONIN T: 0.03 NG/ML
TSH SERPL DL<=0.05 MIU/L-ACNC: 3.84 UIU/ML (ref 0.4–4.2)
UROBILINOGEN, URINE: 0.2 EU/DL (ref 0–1)
WBC # BLD: 19.1 THOU/MM3 (ref 4.8–10.8)
WBC UA: ABNORMAL /HPF
YEAST: ABNORMAL

## 2019-11-28 PROCEDURE — 83605 ASSAY OF LACTIC ACID: CPT

## 2019-11-28 PROCEDURE — 84443 ASSAY THYROID STIM HORMONE: CPT

## 2019-11-28 PROCEDURE — 72125 CT NECK SPINE W/O DYE: CPT

## 2019-11-28 PROCEDURE — 84484 ASSAY OF TROPONIN QUANT: CPT

## 2019-11-28 PROCEDURE — 83735 ASSAY OF MAGNESIUM: CPT

## 2019-11-28 PROCEDURE — 80307 DRUG TEST PRSMV CHEM ANLYZR: CPT

## 2019-11-28 PROCEDURE — 82140 ASSAY OF AMMONIA: CPT

## 2019-11-28 PROCEDURE — 83880 ASSAY OF NATRIURETIC PEPTIDE: CPT

## 2019-11-28 PROCEDURE — 70450 CT HEAD/BRAIN W/O DYE: CPT

## 2019-11-28 PROCEDURE — 82272 OCCULT BLD FECES 1-3 TESTS: CPT

## 2019-11-28 PROCEDURE — 80053 COMPREHEN METABOLIC PANEL: CPT

## 2019-11-28 PROCEDURE — 99285 EMERGENCY DEPT VISIT HI MDM: CPT

## 2019-11-28 PROCEDURE — 81001 URINALYSIS AUTO W/SCOPE: CPT

## 2019-11-28 PROCEDURE — 2709999900 HC NON-CHARGEABLE SUPPLY

## 2019-11-28 PROCEDURE — 86850 RBC ANTIBODY SCREEN: CPT

## 2019-11-28 PROCEDURE — G0480 DRUG TEST DEF 1-7 CLASSES: HCPCS

## 2019-11-28 PROCEDURE — 86901 BLOOD TYPING SEROLOGIC RH(D): CPT

## 2019-11-28 PROCEDURE — 87040 BLOOD CULTURE FOR BACTERIA: CPT

## 2019-11-28 PROCEDURE — 93005 ELECTROCARDIOGRAM TRACING: CPT | Performed by: PHYSICIAN ASSISTANT

## 2019-11-28 PROCEDURE — 80175 DRUG SCREEN QUAN LAMOTRIGINE: CPT

## 2019-11-28 PROCEDURE — 82248 BILIRUBIN DIRECT: CPT

## 2019-11-28 PROCEDURE — 36415 COLL VENOUS BLD VENIPUNCTURE: CPT

## 2019-11-28 PROCEDURE — 71045 X-RAY EXAM CHEST 1 VIEW: CPT

## 2019-11-28 PROCEDURE — 85025 COMPLETE CBC W/AUTO DIFF WBC: CPT

## 2019-11-28 PROCEDURE — 86900 BLOOD TYPING SEROLOGIC ABO: CPT

## 2019-11-28 PROCEDURE — 83690 ASSAY OF LIPASE: CPT

## 2019-11-28 PROCEDURE — 80074 ACUTE HEPATITIS PANEL: CPT

## 2019-11-28 RX ORDER — 0.9 % SODIUM CHLORIDE 0.9 %
30 INTRAVENOUS SOLUTION INTRAVENOUS ONCE
Status: DISCONTINUED | OUTPATIENT
Start: 2019-11-28 | End: 2019-11-28 | Stop reason: HOSPADM

## 2019-11-28 ASSESSMENT — ENCOUNTER SYMPTOMS
ABDOMINAL PAIN: 0
SHORTNESS OF BREATH: 0

## 2019-11-30 LAB — LAMOTRIGINE LEVEL: 2.3 UG/ML (ref 2.5–15)

## 2019-12-04 LAB
BLOOD CULTURE, ROUTINE: NORMAL
BLOOD CULTURE, ROUTINE: NORMAL

## 2019-12-05 ENCOUNTER — HOSPITAL ENCOUNTER (EMERGENCY)
Age: 59
Discharge: HOME HEALTH | End: 2019-12-05
Attending: EMERGENCY MEDICINE
Payer: COMMERCIAL

## 2019-12-05 ENCOUNTER — APPOINTMENT (OUTPATIENT)
Dept: GENERAL RADIOLOGY | Age: 59
End: 2019-12-05
Payer: COMMERCIAL

## 2019-12-05 ENCOUNTER — APPOINTMENT (OUTPATIENT)
Dept: CT IMAGING | Age: 59
End: 2019-12-05
Payer: COMMERCIAL

## 2019-12-05 VITALS
WEIGHT: 145 LBS | TEMPERATURE: 97.9 F | SYSTOLIC BLOOD PRESSURE: 151 MMHG | OXYGEN SATURATION: 98 % | RESPIRATION RATE: 16 BRPM | HEART RATE: 85 BPM | BODY MASS INDEX: 24.89 KG/M2 | DIASTOLIC BLOOD PRESSURE: 71 MMHG

## 2019-12-05 DIAGNOSIS — R11.2 NON-INTRACTABLE VOMITING WITH NAUSEA, UNSPECIFIED VOMITING TYPE: Primary | ICD-10-CM

## 2019-12-05 DIAGNOSIS — R77.8 ELEVATED TROPONIN: ICD-10-CM

## 2019-12-05 DIAGNOSIS — F20.9 SCHIZOPHRENIA, UNSPECIFIED TYPE (HCC): ICD-10-CM

## 2019-12-05 LAB
ALBUMIN SERPL-MCNC: 4.2 G/DL (ref 3.5–5.1)
ALLEN TEST: ABNORMAL
ALP BLD-CCNC: 116 U/L (ref 38–126)
ALT SERPL-CCNC: 50 U/L (ref 11–66)
AMMONIA: 18 UMOL/L (ref 11–60)
ANION GAP SERPL CALCULATED.3IONS-SCNC: 17 MEQ/L (ref 8–16)
AST SERPL-CCNC: 34 U/L (ref 5–40)
BACTERIA: ABNORMAL /HPF
BASE EXCESS (CALCULATED): 4 MMOL/L (ref -2.5–2.5)
BASOPHILS # BLD: 0.8 %
BASOPHILS ABSOLUTE: 0.1 THOU/MM3 (ref 0–0.1)
BILIRUB SERPL-MCNC: 0.3 MG/DL (ref 0.3–1.2)
BILIRUBIN DIRECT: < 0.2 MG/DL (ref 0–0.3)
BILIRUBIN URINE: NEGATIVE
BLOOD, URINE: NEGATIVE
BUN BLDV-MCNC: 15 MG/DL (ref 7–22)
CALCIUM SERPL-MCNC: 10.3 MG/DL (ref 8.5–10.5)
CASTS 2: ABNORMAL /LPF
CASTS UA: ABNORMAL /LPF
CHARACTER, URINE: CLEAR
CHLORIDE BLD-SCNC: 94 MEQ/L (ref 98–111)
CO2: 24 MEQ/L (ref 23–33)
COLLECTED BY:: ABNORMAL
COLOR: YELLOW
CREAT SERPL-MCNC: 0.8 MG/DL (ref 0.4–1.2)
CRYSTALS, UA: ABNORMAL
DEVICE: ABNORMAL
EKG ATRIAL RATE: 75 BPM
EKG P AXIS: 63 DEGREES
EKG P-R INTERVAL: 148 MS
EKG Q-T INTERVAL: 362 MS
EKG QRS DURATION: 80 MS
EKG QTC CALCULATION (BAZETT): 404 MS
EKG R AXIS: 66 DEGREES
EKG T AXIS: 81 DEGREES
EKG VENTRICULAR RATE: 75 BPM
EOSINOPHIL # BLD: 0.8 %
EOSINOPHILS ABSOLUTE: 0.1 THOU/MM3 (ref 0–0.4)
EPITHELIAL CELLS, UA: ABNORMAL /HPF
ERYTHROCYTE [DISTWIDTH] IN BLOOD BY AUTOMATED COUNT: 13 % (ref 11.5–14.5)
ERYTHROCYTE [DISTWIDTH] IN BLOOD BY AUTOMATED COUNT: 42.5 FL (ref 35–45)
GFR SERPL CREATININE-BSD FRML MDRD: > 90 ML/MIN/1.73M2
GLUCOSE BLD-MCNC: 221 MG/DL (ref 70–108)
GLUCOSE URINE: >= 1000 MG/DL
HCO3: 29 MMOL/L (ref 23–28)
HCT VFR BLD CALC: 36.4 % (ref 42–52)
HEMOGLOBIN: 12.1 GM/DL (ref 14–18)
IMMATURE GRANS (ABS): 0.09 THOU/MM3 (ref 0–0.07)
IMMATURE GRANULOCYTES: 0.7 %
KETONES, URINE: NEGATIVE
LEUKOCYTE ESTERASE, URINE: NEGATIVE
LIPASE: 10.9 U/L (ref 5.6–51.3)
LYMPHOCYTES # BLD: 12.5 %
LYMPHOCYTES ABSOLUTE: 1.6 THOU/MM3 (ref 1–4.8)
MCH RBC QN AUTO: 30.3 PG (ref 26–33)
MCHC RBC AUTO-ENTMCNC: 33.2 GM/DL (ref 32.2–35.5)
MCV RBC AUTO: 91.2 FL (ref 80–94)
MISCELLANEOUS 2: ABNORMAL
MONOCYTES # BLD: 9.7 %
MONOCYTES ABSOLUTE: 1.3 THOU/MM3 (ref 0.4–1.3)
NITRITE, URINE: NEGATIVE
NUCLEATED RED BLOOD CELLS: 0 /100 WBC
O2 SATURATION: 94 %
OSMOLALITY CALCULATION: 277.7 MOSMOL/KG (ref 275–300)
PCO2: 41 MMHG (ref 35–45)
PH BLOOD GAS: 7.45 (ref 7.35–7.45)
PH UA: 6 (ref 5–9)
PLATELET # BLD: 431 THOU/MM3 (ref 130–400)
PMV BLD AUTO: 9.2 FL (ref 9.4–12.4)
PO2: 68 MMHG (ref 71–104)
POTASSIUM REFLEX MAGNESIUM: 4.3 MEQ/L (ref 3.5–5.2)
PROTEIN UA: ABNORMAL
RBC # BLD: 3.99 MILL/MM3 (ref 4.7–6.1)
RBC URINE: ABNORMAL /HPF
REASON FOR REJECTION: NORMAL
REJECTED TEST: NORMAL
RENAL EPITHELIAL, UA: ABNORMAL
SEG NEUTROPHILS: 75.5 %
SEGMENTED NEUTROPHILS ABSOLUTE COUNT: 9.8 THOU/MM3 (ref 1.8–7.7)
SODIUM BLD-SCNC: 135 MEQ/L (ref 135–145)
SOURCE, BLOOD GAS: ABNORMAL
SPECIFIC GRAVITY, URINE: 1.02 (ref 1–1.03)
TOTAL PROTEIN: 6.6 G/DL (ref 6.1–8)
TROPONIN T: 0.01 NG/ML
TROPONIN T: 0.01 NG/ML
UROBILINOGEN, URINE: 0.2 EU/DL (ref 0–1)
WBC # BLD: 13 THOU/MM3 (ref 4.8–10.8)
WBC UA: ABNORMAL /HPF
YEAST: ABNORMAL

## 2019-12-05 PROCEDURE — 70450 CT HEAD/BRAIN W/O DYE: CPT

## 2019-12-05 PROCEDURE — 82140 ASSAY OF AMMONIA: CPT

## 2019-12-05 PROCEDURE — 74176 CT ABD & PELVIS W/O CONTRAST: CPT

## 2019-12-05 PROCEDURE — 83690 ASSAY OF LIPASE: CPT

## 2019-12-05 PROCEDURE — 94761 N-INVAS EAR/PLS OXIMETRY MLT: CPT

## 2019-12-05 PROCEDURE — 93010 ELECTROCARDIOGRAM REPORT: CPT | Performed by: INTERNAL MEDICINE

## 2019-12-05 PROCEDURE — 2709999900 HC NON-CHARGEABLE SUPPLY

## 2019-12-05 PROCEDURE — 85025 COMPLETE CBC W/AUTO DIFF WBC: CPT

## 2019-12-05 PROCEDURE — 80048 BASIC METABOLIC PNL TOTAL CA: CPT

## 2019-12-05 PROCEDURE — 84484 ASSAY OF TROPONIN QUANT: CPT

## 2019-12-05 PROCEDURE — 36415 COLL VENOUS BLD VENIPUNCTURE: CPT

## 2019-12-05 PROCEDURE — 36600 WITHDRAWAL OF ARTERIAL BLOOD: CPT

## 2019-12-05 PROCEDURE — 99284 EMERGENCY DEPT VISIT MOD MDM: CPT

## 2019-12-05 PROCEDURE — 2580000003 HC RX 258: Performed by: EMERGENCY MEDICINE

## 2019-12-05 PROCEDURE — 80076 HEPATIC FUNCTION PANEL: CPT

## 2019-12-05 PROCEDURE — 93005 ELECTROCARDIOGRAM TRACING: CPT | Performed by: EMERGENCY MEDICINE

## 2019-12-05 PROCEDURE — 82803 BLOOD GASES ANY COMBINATION: CPT

## 2019-12-05 PROCEDURE — 81001 URINALYSIS AUTO W/SCOPE: CPT

## 2019-12-05 PROCEDURE — 71045 X-RAY EXAM CHEST 1 VIEW: CPT

## 2019-12-05 RX ORDER — 0.9 % SODIUM CHLORIDE 0.9 %
1000 INTRAVENOUS SOLUTION INTRAVENOUS ONCE
Status: COMPLETED | OUTPATIENT
Start: 2019-12-05 | End: 2019-12-05

## 2019-12-05 RX ADMIN — SODIUM CHLORIDE 1000 ML: 9 INJECTION, SOLUTION INTRAVENOUS at 14:14

## 2019-12-05 RX ADMIN — SODIUM CHLORIDE 1000 ML: 9 INJECTION, SOLUTION INTRAVENOUS at 16:53

## 2019-12-05 ASSESSMENT — ENCOUNTER SYMPTOMS
SORE THROAT: 0
VOMITING: 0
EYE REDNESS: 0
COUGH: 0
BACK PAIN: 0
CHEST TIGHTNESS: 0
SINUS PRESSURE: 0
ABDOMINAL PAIN: 0
NAUSEA: 0
PHOTOPHOBIA: 0
COLOR CHANGE: 0

## 2019-12-05 ASSESSMENT — PAIN DESCRIPTION - LOCATION: LOCATION: ABDOMEN

## 2019-12-05 ASSESSMENT — PAIN SCALES - GENERAL: PAINLEVEL_OUTOF10: 10

## 2020-01-06 ENCOUNTER — HOSPITAL ENCOUNTER (EMERGENCY)
Age: 60
Discharge: ANOTHER HOSPITAL | End: 2020-01-06
Attending: EMERGENCY MEDICINE
Payer: COMMERCIAL

## 2020-01-06 ENCOUNTER — APPOINTMENT (OUTPATIENT)
Dept: CT IMAGING | Age: 60
End: 2020-01-06
Payer: COMMERCIAL

## 2020-01-06 ENCOUNTER — APPOINTMENT (OUTPATIENT)
Dept: GENERAL RADIOLOGY | Age: 60
End: 2020-01-06
Payer: COMMERCIAL

## 2020-01-06 VITALS
HEIGHT: 64 IN | TEMPERATURE: 98.9 F | HEART RATE: 97 BPM | OXYGEN SATURATION: 93 % | DIASTOLIC BLOOD PRESSURE: 88 MMHG | BODY MASS INDEX: 23.56 KG/M2 | RESPIRATION RATE: 17 BRPM | WEIGHT: 138 LBS | SYSTOLIC BLOOD PRESSURE: 185 MMHG

## 2020-01-06 LAB
ABO: NORMAL
ALBUMIN SERPL-MCNC: 4.7 G/DL (ref 3.5–5.1)
ALP BLD-CCNC: 127 U/L (ref 38–126)
ALT SERPL-CCNC: 11 U/L (ref 11–66)
ANION GAP SERPL CALCULATED.3IONS-SCNC: 20 MEQ/L (ref 8–16)
ANTIBODY SCREEN: NORMAL
APTT: 24.4 SECONDS (ref 22–38)
AST SERPL-CCNC: 11 U/L (ref 5–40)
BACTERIA: ABNORMAL /HPF
BASOPHILS # BLD: 0.4 %
BASOPHILS ABSOLUTE: 0.1 THOU/MM3 (ref 0–0.1)
BILIRUB SERPL-MCNC: 0.4 MG/DL (ref 0.3–1.2)
BILIRUBIN URINE: NEGATIVE
BLOOD, URINE: ABNORMAL
BUN BLDV-MCNC: 26 MG/DL (ref 7–22)
CALCIUM SERPL-MCNC: 11.7 MG/DL (ref 8.5–10.5)
CASTS 2: ABNORMAL /LPF
CASTS UA: ABNORMAL /LPF
CHARACTER, URINE: ABNORMAL
CHLORIDE BLD-SCNC: 84 MEQ/L (ref 98–111)
CHP ED QC CHECK: 268
CO2: 32 MEQ/L (ref 23–33)
COLOR: YELLOW
CREAT SERPL-MCNC: 0.9 MG/DL (ref 0.4–1.2)
CRYSTALS, UA: ABNORMAL
EKG ATRIAL RATE: 94 BPM
EKG P AXIS: 70 DEGREES
EKG P-R INTERVAL: 132 MS
EKG Q-T INTERVAL: 338 MS
EKG QRS DURATION: 84 MS
EKG QTC CALCULATION (BAZETT): 422 MS
EKG R AXIS: 74 DEGREES
EKG T AXIS: 82 DEGREES
EKG VENTRICULAR RATE: 94 BPM
EOSINOPHIL # BLD: 0.1 %
EOSINOPHILS ABSOLUTE: 0 THOU/MM3 (ref 0–0.4)
EPITHELIAL CELLS, UA: ABNORMAL /HPF
ERYTHROCYTE [DISTWIDTH] IN BLOOD BY AUTOMATED COUNT: 13.8 % (ref 11.5–14.5)
ERYTHROCYTE [DISTWIDTH] IN BLOOD BY AUTOMATED COUNT: 43.8 FL (ref 35–45)
FLU A ANTIGEN: NEGATIVE
FLU B ANTIGEN: NEGATIVE
GFR SERPL CREATININE-BSD FRML MDRD: 86 ML/MIN/1.73M2
GLUCOSE BLD-MCNC: 268 MG/DL
GLUCOSE BLD-MCNC: 268 MG/DL (ref 70–108)
GLUCOSE BLD-MCNC: 341 MG/DL (ref 70–108)
GLUCOSE BLD-MCNC: 364 MG/DL (ref 70–108)
GLUCOSE URINE: 500 MG/DL
HCT VFR BLD CALC: 45.5 % (ref 42–52)
HEMOGLOBIN: 15.3 GM/DL (ref 14–18)
IMMATURE GRANS (ABS): 0.16 THOU/MM3 (ref 0–0.07)
IMMATURE GRANULOCYTES: 0.6 %
INR BLD: 0.97 (ref 0.85–1.13)
KETONES, URINE: ABNORMAL
LACTIC ACID, SEPSIS: 2.5 MMOL/L (ref 0.5–1.9)
LACTIC ACID: 2.1 MMOL/L (ref 0.5–2.2)
LEUKOCYTE ESTERASE, URINE: ABNORMAL
LIPASE: 4.3 U/L (ref 5.6–51.3)
LYMPHOCYTES # BLD: 5.9 %
LYMPHOCYTES ABSOLUTE: 1.5 THOU/MM3 (ref 1–4.8)
MCH RBC QN AUTO: 29.8 PG (ref 26–33)
MCHC RBC AUTO-ENTMCNC: 33.6 GM/DL (ref 32.2–35.5)
MCV RBC AUTO: 88.7 FL (ref 80–94)
MISCELLANEOUS 2: ABNORMAL
MONOCYTES # BLD: 5.7 %
MONOCYTES ABSOLUTE: 1.5 THOU/MM3 (ref 0.4–1.3)
NITRITE, URINE: NEGATIVE
NUCLEATED RED BLOOD CELLS: 0 /100 WBC
OSMOLALITY CALCULATION: 290.2 MOSMOL/KG (ref 275–300)
PH UA: 8.5 (ref 5–9)
PLATELET # BLD: 431 THOU/MM3 (ref 130–400)
PMV BLD AUTO: 10.5 FL (ref 9.4–12.4)
POTASSIUM SERPL-SCNC: 3.8 MEQ/L (ref 3.5–5.2)
PROTEIN UA: 300
RBC # BLD: 5.13 MILL/MM3 (ref 4.7–6.1)
RBC URINE: ABNORMAL /HPF
REASON FOR REJECTION: NORMAL
REJECTED TEST: NORMAL
RENAL EPITHELIAL, UA: ABNORMAL
RH FACTOR: NORMAL
SEG NEUTROPHILS: 87.3 %
SEGMENTED NEUTROPHILS ABSOLUTE COUNT: 22.5 THOU/MM3 (ref 1.8–7.7)
SODIUM BLD-SCNC: 136 MEQ/L (ref 135–145)
SPECIFIC GRAVITY, URINE: > 1.03 (ref 1–1.03)
TOTAL PROTEIN: 8 G/DL (ref 6.1–8)
TROPONIN T: 0.03 NG/ML
UROBILINOGEN, URINE: 1 EU/DL (ref 0–1)
WBC # BLD: 25.8 THOU/MM3 (ref 4.8–10.8)
WBC UA: > 200 /HPF
YEAST: ABNORMAL

## 2020-01-06 PROCEDURE — 86900 BLOOD TYPING SEROLOGIC ABO: CPT

## 2020-01-06 PROCEDURE — 86850 RBC ANTIBODY SCREEN: CPT

## 2020-01-06 PROCEDURE — 96366 THER/PROPH/DIAG IV INF ADDON: CPT

## 2020-01-06 PROCEDURE — 80053 COMPREHEN METABOLIC PANEL: CPT

## 2020-01-06 PROCEDURE — 83690 ASSAY OF LIPASE: CPT

## 2020-01-06 PROCEDURE — 86901 BLOOD TYPING SEROLOGIC RH(D): CPT

## 2020-01-06 PROCEDURE — 74177 CT ABD & PELVIS W/CONTRAST: CPT

## 2020-01-06 PROCEDURE — 96375 TX/PRO/DX INJ NEW DRUG ADDON: CPT

## 2020-01-06 PROCEDURE — 85610 PROTHROMBIN TIME: CPT

## 2020-01-06 PROCEDURE — 87086 URINE CULTURE/COLONY COUNT: CPT

## 2020-01-06 PROCEDURE — 84484 ASSAY OF TROPONIN QUANT: CPT

## 2020-01-06 PROCEDURE — 83605 ASSAY OF LACTIC ACID: CPT

## 2020-01-06 PROCEDURE — 96367 TX/PROPH/DG ADDL SEQ IV INF: CPT

## 2020-01-06 PROCEDURE — 99285 EMERGENCY DEPT VISIT HI MDM: CPT

## 2020-01-06 PROCEDURE — 6360000004 HC RX CONTRAST MEDICATION: Performed by: EMERGENCY MEDICINE

## 2020-01-06 PROCEDURE — 96365 THER/PROPH/DIAG IV INF INIT: CPT

## 2020-01-06 PROCEDURE — 93005 ELECTROCARDIOGRAM TRACING: CPT | Performed by: EMERGENCY MEDICINE

## 2020-01-06 PROCEDURE — 87040 BLOOD CULTURE FOR BACTERIA: CPT

## 2020-01-06 PROCEDURE — 85730 THROMBOPLASTIN TIME PARTIAL: CPT

## 2020-01-06 PROCEDURE — 81001 URINALYSIS AUTO W/SCOPE: CPT

## 2020-01-06 PROCEDURE — 93010 ELECTROCARDIOGRAM REPORT: CPT | Performed by: INTERNAL MEDICINE

## 2020-01-06 PROCEDURE — 6370000000 HC RX 637 (ALT 250 FOR IP): Performed by: EMERGENCY MEDICINE

## 2020-01-06 PROCEDURE — 85025 COMPLETE CBC W/AUTO DIFF WBC: CPT

## 2020-01-06 PROCEDURE — 87804 INFLUENZA ASSAY W/OPTIC: CPT

## 2020-01-06 PROCEDURE — 87186 SC STD MICRODIL/AGAR DIL: CPT

## 2020-01-06 PROCEDURE — 82948 REAGENT STRIP/BLOOD GLUCOSE: CPT

## 2020-01-06 PROCEDURE — 6360000002 HC RX W HCPCS: Performed by: EMERGENCY MEDICINE

## 2020-01-06 PROCEDURE — 71045 X-RAY EXAM CHEST 1 VIEW: CPT

## 2020-01-06 PROCEDURE — 2580000003 HC RX 258: Performed by: EMERGENCY MEDICINE

## 2020-01-06 PROCEDURE — 36415 COLL VENOUS BLD VENIPUNCTURE: CPT

## 2020-01-06 PROCEDURE — 2709999900 HC NON-CHARGEABLE SUPPLY

## 2020-01-06 PROCEDURE — 87077 CULTURE AEROBIC IDENTIFY: CPT

## 2020-01-06 RX ORDER — LISINOPRIL 5 MG/1
5 TABLET ORAL ONCE
Status: COMPLETED | OUTPATIENT
Start: 2020-01-06 | End: 2020-01-06

## 2020-01-06 RX ORDER — 0.9 % SODIUM CHLORIDE 0.9 %
500 INTRAVENOUS SOLUTION INTRAVENOUS ONCE
Status: COMPLETED | OUTPATIENT
Start: 2020-01-06 | End: 2020-01-06

## 2020-01-06 RX ORDER — HYDRALAZINE HYDROCHLORIDE 20 MG/ML
5 INJECTION INTRAMUSCULAR; INTRAVENOUS ONCE
Status: COMPLETED | OUTPATIENT
Start: 2020-01-06 | End: 2020-01-06

## 2020-01-06 RX ORDER — ASPIRIN 81 MG/1
324 TABLET, CHEWABLE ORAL ONCE
Status: COMPLETED | OUTPATIENT
Start: 2020-01-06 | End: 2020-01-06

## 2020-01-06 RX ORDER — SODIUM CHLORIDE 9 MG/ML
1000 INJECTION, SOLUTION INTRAVENOUS CONTINUOUS
Status: DISCONTINUED | OUTPATIENT
Start: 2020-01-06 | End: 2020-01-06 | Stop reason: HOSPADM

## 2020-01-06 RX ORDER — ONDANSETRON 2 MG/ML
4 INJECTION INTRAMUSCULAR; INTRAVENOUS ONCE
Status: COMPLETED | OUTPATIENT
Start: 2020-01-06 | End: 2020-01-06

## 2020-01-06 RX ADMIN — SODIUM CHLORIDE 500 ML: 9 INJECTION, SOLUTION INTRAVENOUS at 16:25

## 2020-01-06 RX ADMIN — HYDRALAZINE HYDROCHLORIDE 5 MG: 20 INJECTION, SOLUTION INTRAMUSCULAR; INTRAVENOUS at 21:08

## 2020-01-06 RX ADMIN — IOPAMIDOL 80 ML: 755 INJECTION, SOLUTION INTRAVENOUS at 15:02

## 2020-01-06 RX ADMIN — PIPERACILLIN AND TAZOBACTAM 3.38 G: 3; .375 INJECTION, POWDER, LYOPHILIZED, FOR SOLUTION INTRAVENOUS at 17:20

## 2020-01-06 RX ADMIN — LISINOPRIL 5 MG: 5 TABLET ORAL at 18:06

## 2020-01-06 RX ADMIN — SODIUM CHLORIDE 1000 ML: 9 INJECTION, SOLUTION INTRAVENOUS at 12:53

## 2020-01-06 RX ADMIN — VANCOMYCIN HYDROCHLORIDE 1000 MG: 1 INJECTION, POWDER, LYOPHILIZED, FOR SOLUTION INTRAVENOUS at 18:06

## 2020-01-06 RX ADMIN — ASPIRIN 81 MG 324 MG: 81 TABLET ORAL at 17:19

## 2020-01-06 RX ADMIN — SODIUM CHLORIDE 500 ML: 9 INJECTION, SOLUTION INTRAVENOUS at 21:20

## 2020-01-06 RX ADMIN — Medication 6 UNITS: at 21:07

## 2020-01-06 RX ADMIN — ONDANSETRON 4 MG: 2 INJECTION INTRAMUSCULAR; INTRAVENOUS at 12:53

## 2020-01-06 ASSESSMENT — PAIN DESCRIPTION - DESCRIPTORS: DESCRIPTORS: ACHING

## 2020-01-06 ASSESSMENT — ENCOUNTER SYMPTOMS
WHEEZING: 0
RHINORRHEA: 0
ABDOMINAL PAIN: 1
COUGH: 0
EYE PAIN: 0
VOMITING: 1
SORE THROAT: 0
BACK PAIN: 0
SHORTNESS OF BREATH: 0
BLOOD IN STOOL: 1
NAUSEA: 1
PHOTOPHOBIA: 0

## 2020-01-06 ASSESSMENT — PAIN SCALES - GENERAL: PAINLEVEL_OUTOF10: 7

## 2020-01-06 ASSESSMENT — PAIN DESCRIPTION - PAIN TYPE: TYPE: ACUTE PAIN

## 2020-01-06 ASSESSMENT — PAIN DESCRIPTION - LOCATION: LOCATION: ABDOMEN

## 2020-01-06 ASSESSMENT — PAIN DESCRIPTION - ORIENTATION: ORIENTATION: MID;LOWER

## 2020-01-06 NOTE — ED NOTES
[facility] inmate sent to ER after experiencing some nausea and lower abdominal pain. It was reported that pt has been vomiting for the past 3 days and hardly able to keep his meals down. It is also reported that the patient has lost 7 lbs in the last week. Upon arrival pt alert and oriented, but has intermittent periods of confusion. Officers at bedside report that this is slightly normal for this patient, but appears to be more persistent.  IV established prior to arrival.      Maria L Doaln RN  01/06/20 8899

## 2020-01-06 NOTE — ED NOTES
Blood sent to lab. Updated on POC. Officers remain at bedside. Call light within reach. No concerns voiced.      Gayathri Armijo, [facility]  01/06/20 7655

## 2020-01-06 NOTE — ED PROVIDER NOTES
150 MG tablet Take 150 mg by mouth dailyHistorical Med      !! insulin NPH (HUMULIN N;NOVOLIN N) 100 UNIT/ML injection vial Inject into the skin See Admin Instructions 24 units sc q am and 27 units q pm fsHistorical Med      !! insulin NPH (HUMULIN N;NOVOLIN N) 100 UNIT/ML injection vial Inject into the skin 3 times daily (before meals) Indications: with dinner Per sliding scale 150-199=3U, 200-249=6U, 250-299= 9u, 300-349 = 12u, 350-400 = 15u >400 call alp fsHistorical Med      insulin regular (HUMULIN R;NOVOLIN R) 100 UNIT/ML injection Inject 6 Units into the skin 3 times daily (before meals) Indications: 8 units before meals three times a day. Historical Med      calcium carbonate (TUMS) 500 MG chewable tablet Take 1 tablet by mouth 4 times daily as needed for Heartburn 1-2 daily QID PRNHistorical Med      metFORMIN (GLUCOPHAGE) 500 MG tablet Take 1,000 mg by mouth 2 times daily (with meals) Historical Med      aspirin EC 81 MG EC tablet Take 81 mg by mouth dailyHistorical Med      albuterol sulfate  (90 Base) MCG/ACT inhaler Inhale 2 puffs into the lungs every 6 hours as needed for WheezingHistorical Med      amitriptyline (ELAVIL) 50 MG tablet Take 50 mg by mouth nightlyHistorical Med      budesonide-formoterol (SYMBICORT) 80-4.5 MCG/ACT AERO Inhale 2 puffs into the lungs 2 times dailyHistorical Med       !! - Potential duplicate medications found. Please discuss with provider. ALLERGIES     is allergic to sulfa antibiotics and sulphasomidine. FAMILY HISTORY     has no family status information on file. family history is not on file. SOCIAL HISTORY      reports that he has quit smoking. His smoking use included cigarettes. He smoked 1.00 pack per day. He has never used smokeless tobacco. He reports that he does not drink alcohol or use drugs. PHYSICAL EXAM     INITIAL VITALS:  height is 5' 4\" (1.626 m) and weight is 138 lb (62.6 kg). His oral temperature is 98.9 °F (37.2 °C).  His blood pressure is 185/88 (abnormal) and his pulse is 97. His respiration is 17 and oxygen saturation is 93%. CONSTITUTIONAL: [Awake, alert, non toxic, well developed, well nourished, no acute distress other]  HEAD: [Normocephalic, atraumatic]  EYES: [Pupils equal, round & reactive to light, extraocular movements intact, no nystagmus, clear conjunctiva, non-icteric sclera]  ENT: [External ear canal clear without evidence of cerumen impaction or foreign body, TM's clear without erythema or bulging. Nares patent without drainage, septum appears midline. Moist mucus membranes, oropharynx clear without exudate, erythema, or mass. Uvula midline]  NECK: [Nontender and supple. No meningismus, no appreciated lymphadenopathy. Intact full range of motion. C-spine midline without vertebral tenderness. Trachea midline.]  CHEST: [Inspection normal, no lesions, equal rise. No crepitus or tenderness upon palpation.]  CARDIOVASCULAR: [Regular rate, rhythm, normal S1 and S2. No appreciated murmurs, rubs, or gallops. No pulse deficits appreciated. Intact distal perfusion. JVD not appreciated.]  PULMONARY: [Respiratory distress absent. Respiratory effort normal. Breath sounds clear to auscultation without rhonchi, rales, or wheezing. No accessory muscle use. No stridor]  ABDOMEN: [Inspection normal, with surgical scar from reported appendectomy. Soft, diffusely tender on initatial examination, I then reexamined him at 1340 and he denies any abdominal tenderness, non-distended, with normoactive bowel sounds. No palpable masses, rebound, or guarding]  BACK: [Intact ROM. No midline vertebral tenderness, step off, or crepitus. No CVA tenderness.]  MUSCULOSKELETAL: [Extremities nontender to palpation. No gross deformity or evidence of external trauma. Intact range of motion. Sensation intact. No clubbing, cyanosis, or edema.]  SKIN: [Warm, dry.  No jaundice, rash, urticaria, or petechiae]  NEUROLOGIC: [Alert and oriented x 3, GCS 15, CULTURE, REFLEXED, URINE    Narrative:     Source: urine, clean catch       Site:           Current Antibiotics: not stated   PROTIME-INR   APTT   SPECIMEN REJECTION   OSMOLALITY   LACTIC ACID, PLASMA   TYPE AND SCREEN       EMERGENCY DEPARTMENT COURSE:   Vitals:    Vitals:    01/06/20 2101 01/06/20 2103 01/06/20 2107 01/06/20 2108   BP: (!) 194/89 (!) 192/85 (!) 185/88 (!) 185/88   Pulse: 101 92  97   Resp: 20 19 17   Temp:       TempSrc:       SpO2:       Weight:       Height:         1111: Patient arrived to ED per EMS    1113: Patient triage started, chief complained updated, and vital signs assessed. 1117: Triage completed. 1118: Peripheral IV line assessed. 1154: EKG, labs, CT of Abdomen and pelvisand Chest XR ordered. Zofran ordered. 1200: Epic predictive sepsis risk. 1203: Chest XR performed. [id number]: Chest XR resulted with no acute findings. 1220: EKG performed. 1340: IV fluids were ordered. 1514: CT of abdomen and pelvis showed There is a moderate to large amount of stool throughout the colon. No bowel wall thickening or obstruction. Correlate for constipation. 1602: aspirin was ordered. 1605: Zosyn and vancomycin was ordered. 1607: I consulted with [facility] transport who contacted Dr. Irlanda Llamas (emergency) who accepted to patient for transport to their emergency department. 1742: lisinopril was ordered. Patient blood glucose checked, was >300, insulin and additional fluid ordered. BP had improved after lisinopril then began to increase again, IV hydralazine ordered prior to transfer. CRITICAL CARE:   None     CONSULTS:  [facility] transfer center, Dr. Valeria Jasmine accepting    PROCEDURES:  None    FINAL IMPRESSION      1. Urinary tract infection in male    2. Leukocytosis, unspecified type          DISPOSITION/PLAN   Transfer     PATIENT REFERRED TO:  No follow-up provider specified.     DISCHARGE MEDICATIONS:  Discharge Medication List as of 1/6/2020  9:30 PM

## 2020-01-06 NOTE — ED NOTES
Bed: 001A  Expected date: 1/6/20  Expected time: 11:03 AM  Means of arrival: [facility]  Comments:     Jean Carlos Taylor RN  01/06/20 8902

## 2020-01-06 NOTE — ED NOTES
Pt eating turkey sandwich. Small emesis x1. Pt instructed to hold off from eating at this time until his nausea goes away. Officers remain at bedside. No concerns voiced.      Anny Pineda RN  01/06/20 7940

## 2020-01-07 NOTE — ED NOTES
Upon first contact with patient this RN receives bedside shift report from [facility]. Pt is watching TV and eating apple sauce. He is toelrating this well. Security remains at bedside.         Radha Manley RN  01/06/20 2126

## 2020-01-07 NOTE — ED NOTES
On presentation to room pt is eating a turkey sandwich and tolerating well. Glucose evaluated. Dr. Diaz Kapoor informed of high glucose and hypertension again. She put orders in for hydralazine and insulin. Informed that transport is here for pt and she instructed to tell EMS to monitor BP and glucose. Report given to EMS crew. They were updated on hydralazine given for hypertension and insulin given for high glucose. Advised to monitor glucose and BP. Pt left in stable condition.      Arabella Kenny RN  01/06/20 3027

## 2020-01-07 NOTE — ED NOTES
ED nurse-to-nurse bedside report    Chief Complaint   Patient presents with    Emesis     x3 days    Weight Loss      LOC: alert and orientated to name, place, date  Vital signs   Vitals:    01/06/20 1316 01/06/20 1432 01/06/20 1722 01/06/20 1832   BP:  (!) 174/79 (!) 192/89 (!) 192/77   Pulse: 107 100 90 90   Resp: 17 16 17 18   Temp:       TempSrc:       SpO2: 95% 93% 90% 93%   Weight:       Height:          Pain:    Pain Interventions:   Pain Goal:   Oxygen: no    Current needs required none  Telemetry: yes  LDAs: 20g rt forearm, 22g left hand  Peripheral IV 01/06/20 Left Hand (Active)   Site Assessment Intact; Clean;Dry 1/6/2020  6:07 PM   Line Status Capped 1/6/2020  6:07 PM   Dressing Status Intact;Dry;Clean 1/6/2020  6:07 PM   Dressing Intervention New 1/6/2020 11:18 AM       Peripheral IV 01/06/20 Right Forearm (Active)   Site Assessment Intact; Clean;Dry 1/6/2020  6:45 PM   Line Status Infusing 1/6/2020  6:45 PM   Dressing Status Clean; Intact;Dry 1/6/2020  6:45 PM   Dressing Intervention New 1/6/2020  1:15 PM     Continuous Infusions:    sodium chloride Stopped (01/06/20 1845)     Mobility: Requires assistance * 1  Davis Fall Risk Score: No flowsheet data found. Fall Interventions: bed at lowest position, officers at bedside  Report given to:  Mychal Pineda RN  01/06/20 3590

## 2020-01-08 LAB
ORGANISM: ABNORMAL
URINE CULTURE REFLEX: ABNORMAL

## 2020-01-09 NOTE — PROGRESS NOTES
Pharmacy Note  ED Culture Follow-up    Estle  is a 62 y.o. male. Allergies: Sulfa antibiotics and Sulphasomidine     Labs:  Lab Results   Component Value Date    BUN 26 (H) 01/06/2020    CREATININE 0.9 01/06/2020    WBC 25.8 (H) 01/06/2020     Estimated Creatinine Clearance: 72 mL/min (based on SCr of 0.9 mg/dL). Current antimicrobials:   Unknown - transferred to [address] [place] [address] Drive:  Micro results:   Urine culture: positive for Enterobacter cloacae complex      PLAN:  Need for intervention: Yes  Discussed with: MARY Sampson  Chosen treatment:    Patient will be treated by [facility] provider. Faxed results to Traci Treasure, [place] at [phone]. Patient response:   No need to contact patient    Called/sent in prescription to: Not applicable    Please call with any questions.  [address] [place]    Jensen Price PharmD 1/9/2020  4:41 PM

## 2020-01-12 LAB
BLOOD CULTURE, ROUTINE: NORMAL
BLOOD CULTURE, ROUTINE: NORMAL

## 2020-03-23 ENCOUNTER — APPOINTMENT (OUTPATIENT)
Dept: CT IMAGING | Age: 60
End: 2020-03-23
Payer: COMMERCIAL

## 2020-03-23 ENCOUNTER — HOSPITAL ENCOUNTER (EMERGENCY)
Age: 60
Discharge: OTHER INSTITUTION | End: 2020-03-23
Attending: EMERGENCY MEDICINE
Payer: COMMERCIAL

## 2020-03-23 VITALS
DIASTOLIC BLOOD PRESSURE: 49 MMHG | HEIGHT: 65 IN | RESPIRATION RATE: 18 BRPM | WEIGHT: 145 LBS | OXYGEN SATURATION: 98 % | TEMPERATURE: 98.1 F | SYSTOLIC BLOOD PRESSURE: 131 MMHG | BODY MASS INDEX: 24.16 KG/M2 | HEART RATE: 80 BPM

## 2020-03-23 LAB
AMPHETAMINE+METHAMPHETAMINE URINE SCREEN: NEGATIVE
ANION GAP SERPL CALCULATED.3IONS-SCNC: 15 MEQ/L (ref 8–16)
BARBITURATE QUANTITATIVE URINE: NEGATIVE
BASOPHILS # BLD: 0.4 %
BASOPHILS ABSOLUTE: 0.1 THOU/MM3 (ref 0–0.1)
BENZODIAZEPINE QUANTITATIVE URINE: NEGATIVE
BILIRUBIN URINE: NEGATIVE
BLOOD, URINE: NEGATIVE
BUN BLDV-MCNC: 26 MG/DL (ref 7–22)
CALCIUM SERPL-MCNC: 9.3 MG/DL (ref 8.5–10.5)
CANNABINOID QUANTITATIVE URINE: NEGATIVE
CHARACTER, URINE: CLEAR
CHLORIDE BLD-SCNC: 96 MEQ/L (ref 98–111)
CO2: 23 MEQ/L (ref 23–33)
COCAINE METABOLITE QUANTITATIVE URINE: NEGATIVE
COLOR: YELLOW
CREAT SERPL-MCNC: 1.1 MG/DL (ref 0.4–1.2)
EKG ATRIAL RATE: 85 BPM
EKG P AXIS: 74 DEGREES
EKG P-R INTERVAL: 142 MS
EKG Q-T INTERVAL: 340 MS
EKG QRS DURATION: 86 MS
EKG QTC CALCULATION (BAZETT): 404 MS
EKG R AXIS: 69 DEGREES
EKG T AXIS: 86 DEGREES
EKG VENTRICULAR RATE: 85 BPM
EOSINOPHIL # BLD: 0 %
EOSINOPHILS ABSOLUTE: 0 THOU/MM3 (ref 0–0.4)
ERYTHROCYTE [DISTWIDTH] IN BLOOD BY AUTOMATED COUNT: 15.3 % (ref 11.5–14.5)
ERYTHROCYTE [DISTWIDTH] IN BLOOD BY AUTOMATED COUNT: 50.8 FL (ref 35–45)
GFR SERPL CREATININE-BSD FRML MDRD: 68 ML/MIN/1.73M2
GLUCOSE BLD-MCNC: 129 MG/DL (ref 70–108)
GLUCOSE BLD-MCNC: 131 MG/DL (ref 70–108)
GLUCOSE URINE: NEGATIVE MG/DL
HCT VFR BLD CALC: 24.2 % (ref 42–52)
HEMOGLOBIN: 7.6 GM/DL (ref 14–18)
IMMATURE GRANS (ABS): 0.13 THOU/MM3 (ref 0–0.07)
IMMATURE GRANULOCYTES: 0.6 %
INR BLD: 1.04 (ref 0.85–1.13)
KETONES, URINE: ABNORMAL
LEUKOCYTE ESTERASE, URINE: NEGATIVE
LYMPHOCYTES # BLD: 9.1 %
LYMPHOCYTES ABSOLUTE: 1.8 THOU/MM3 (ref 1–4.8)
MCH RBC QN AUTO: 28.9 PG (ref 26–33)
MCHC RBC AUTO-ENTMCNC: 31.4 GM/DL (ref 32.2–35.5)
MCV RBC AUTO: 92 FL (ref 80–94)
MONOCYTES # BLD: 7.6 %
MONOCYTES ABSOLUTE: 1.5 THOU/MM3 (ref 0.4–1.3)
NITRITE, URINE: NEGATIVE
NUCLEATED RED BLOOD CELLS: 0 /100 WBC
OPIATES, URINE: NEGATIVE
OSMOLALITY CALCULATION: 274.8 MOSMOL/KG (ref 275–300)
OXYCODONE: NEGATIVE
PH UA: 6 (ref 5–9)
PHENCYCLIDINE QUANTITATIVE URINE: NEGATIVE
PLATELET # BLD: 419 THOU/MM3 (ref 130–400)
PMV BLD AUTO: 9.9 FL (ref 9.4–12.4)
POTASSIUM REFLEX MAGNESIUM: 4.8 MEQ/L (ref 3.5–5.2)
PROTEIN UA: NEGATIVE
RBC # BLD: 2.63 MILL/MM3 (ref 4.7–6.1)
SEG NEUTROPHILS: 82.3 %
SEGMENTED NEUTROPHILS ABSOLUTE COUNT: 16.7 THOU/MM3 (ref 1.8–7.7)
SODIUM BLD-SCNC: 134 MEQ/L (ref 135–145)
SPECIFIC GRAVITY, URINE: 1.03 (ref 1–1.03)
UROBILINOGEN, URINE: 0.2 EU/DL (ref 0–1)
WBC # BLD: 20.3 THOU/MM3 (ref 4.8–10.8)

## 2020-03-23 PROCEDURE — 80307 DRUG TEST PRSMV CHEM ANLYZR: CPT

## 2020-03-23 PROCEDURE — 99285 EMERGENCY DEPT VISIT HI MDM: CPT

## 2020-03-23 PROCEDURE — 99449 NTRPROF PH1/NTRNET/EHR 31/>: CPT | Performed by: PSYCHIATRY & NEUROLOGY

## 2020-03-23 PROCEDURE — 82948 REAGENT STRIP/BLOOD GLUCOSE: CPT

## 2020-03-23 PROCEDURE — 85025 COMPLETE CBC W/AUTO DIFF WBC: CPT

## 2020-03-23 PROCEDURE — 6370000000 HC RX 637 (ALT 250 FOR IP): Performed by: EMERGENCY MEDICINE

## 2020-03-23 PROCEDURE — 36415 COLL VENOUS BLD VENIPUNCTURE: CPT

## 2020-03-23 PROCEDURE — 93005 ELECTROCARDIOGRAM TRACING: CPT | Performed by: EMERGENCY MEDICINE

## 2020-03-23 PROCEDURE — 81003 URINALYSIS AUTO W/O SCOPE: CPT

## 2020-03-23 PROCEDURE — 70496 CT ANGIOGRAPHY HEAD: CPT

## 2020-03-23 PROCEDURE — 70450 CT HEAD/BRAIN W/O DYE: CPT

## 2020-03-23 PROCEDURE — 80048 BASIC METABOLIC PNL TOTAL CA: CPT

## 2020-03-23 PROCEDURE — 6360000004 HC RX CONTRAST MEDICATION: Performed by: EMERGENCY MEDICINE

## 2020-03-23 PROCEDURE — 85610 PROTHROMBIN TIME: CPT

## 2020-03-23 PROCEDURE — 70498 CT ANGIOGRAPHY NECK: CPT

## 2020-03-23 RX ORDER — CLOPIDOGREL 300 MG/1
300 TABLET, FILM COATED ORAL ONCE
Status: COMPLETED | OUTPATIENT
Start: 2020-03-23 | End: 2020-03-23

## 2020-03-23 RX ADMIN — CLOPIDOGREL BISULFATE 300 MG: 300 TABLET, FILM COATED ORAL at 14:55

## 2020-03-23 RX ADMIN — IOPAMIDOL 80 ML: 755 INJECTION, SOLUTION INTRAVENOUS at 13:14

## 2020-03-23 ASSESSMENT — ENCOUNTER SYMPTOMS
SHORTNESS OF BREATH: 0
CHEST TIGHTNESS: 0
NAUSEA: 0
VOMITING: 0
COUGH: 0
RHINORRHEA: 0
ABDOMINAL DISTENTION: 0
DIARRHEA: 0
EYE DISCHARGE: 0
EYE REDNESS: 0
SORE THROAT: 0
BACK PAIN: 0
ABDOMINAL PAIN: 0

## 2020-03-23 NOTE — ED PROVIDER NOTES
(10. ): Mild to moderate dysarthria  Extinction and Inattention (11): No abnormality  Total: 4    REVIEW OF SYSTEMS    (2-9 systems for level 4, 10 or more for level 5)     Review of Systems   Constitutional: Negative for chills, diaphoresis, fatigue and fever. Incoherent speech   HENT: Positive for drooling. Negative for congestion, rhinorrhea and sore throat. Eyes: Negative for discharge, redness and visual disturbance. Respiratory: Negative for cough, chest tightness and shortness of breath. Cardiovascular: Negative for chest pain and leg swelling. Gastrointestinal: Negative for abdominal distention, abdominal pain, diarrhea, nausea and vomiting. Genitourinary: Negative for decreased urine volume, difficulty urinating, dysuria, frequency and hematuria. Musculoskeletal: Negative for arthralgias and back pain. Skin: Negative for rash and wound. Neurological: Positive for facial asymmetry and weakness. Negative for numbness and headaches. Psychiatric/Behavioral: Negative for behavioral problems and confusion. Except as noted above the remainder of the review of systems was reviewed and negative.        PAST MEDICAL HISTORY     Past Medical History:   Diagnosis Date    Bipolar 1 disorder ([address]     Cellulitis     [facility]     Diabetes mellitus ([address]     Emphysema lung ([facility])     Esophagitis     GERD (gastroesophageal reflux disease)     Headache     Hyperlipidemia     Hypertension     Hypothyroidism     Intellectual disability     Mandible fracture ([facility])     Multiple rib fractures 03/07/2014    Schizoaffective disorder ([facility])     Seizures ([facility])     Sinusitis     Subtrochanteric fracture ([facility]          SURGICAL HISTORY       Past Surgical History:   Procedure Laterality Date    APPENDECTOMY      FRACTURE SURGERY      mandible         CURRENT MEDICATIONS       Discharge Medication List as of 3/23/2020  3:07 PM      CONTINUE these medications which have NOT CHANGED    Details   loxapine (LOXITANE) 10 MG capsule Take 10 mg by mouth 3 times dailyHistorical Med      lisinopril (PRINIVIL;ZESTRIL) 5 MG tablet Take 5 mg by mouth dailyHistorical Med      famotidine (PEPCID) 20 MG tablet Take 20 mg by mouth nightly as neededHistorical Med      pantoprazole (PROTONIX) 20 MG tablet Take 2 tablets by mouth daily for 30 doses, Disp-60 tablet, R-0Print      ondansetron (ZOFRAN ODT) 4 MG disintegrating tablet Take 1 tablet by mouth every 8 hours as needed for Nausea, Disp-20 tablet, R-0Print      dicyclomine (BENTYL) 10 MG capsule Take 1 capsule by mouth every 6 hours as needed (cramps), Disp-20 capsule, R-0Print      aluminum & magnesium hydroxide-simethicone (MAALOX) 200-200-20 MG/5ML SUSP suspension Take 30 mLs by mouth 2 times dailyHistorical Med      atorvastatin (LIPITOR) 40 MG tablet Take 40 mg by mouth dailyHistorical Med      ferrous sulfate 325 (65 Fe) MG tablet Take 325 mg by mouth daily (with breakfast)Historical Med      ipratropium (ATROVENT HFA) 17 MCG/ACT inhaler Inhale 2 puffs into the lungs every 6 hoursHistorical Med      lamoTRIgine (LAMICTAL) 150 MG tablet Take 150 mg by mouth dailyHistorical Med      !! insulin NPH (HUMULIN N;NOVOLIN N) 100 UNIT/ML injection vial Inject into the skin See Admin Instructions 24 units sc q am and 27 units q pm fsHistorical Med      !! insulin NPH (HUMULIN N;NOVOLIN N) 100 UNIT/ML injection vial Inject into the skin 3 times daily (before meals) Indications: with dinner Per sliding scale 150-199=3U, 200-249=6U, 250-299= 9u, 300-349 = 12u, 350-400 = 15u >400 call alp fsHistorical Med      insulin regular (HUMULIN R;NOVOLIN R) 100 UNIT/ML injection Inject 6 Units into the skin 3 times daily (before meals) Indications: 8 units before meals three times a day.  Historical Med      calcium carbonate (TUMS) 500 MG chewable tablet Take 1 tablet by mouth 4 times daily as needed for Heartburn 1-2 daily QID Status: He is alert and oriented to person, place, and time. GCS: GCS eye subscore is 4. GCS verbal subscore is 5. GCS motor subscore is 6. Motor: No abnormal muscle tone. Comments: Alert and oriented x3. GCS 15. Following commands. For in depth neuro examination, refer to NIH scale. Psychiatric:         Behavior: Behavior normal.         DIAGNOSTIC RESULTS     EKG: All EKG's are interpreted by the Emergency Department Physician who either signs or Co-signs this chart in the absence of a cardiologist.    EKG demonstrates normal sinus rhythm with a ventricular rate of 85. No ST changes. TWI in aVL which is chronic from 12/2019. RADIOLOGY:   Non-plain film images such as CT, Ultrasound and MRI are read by the radiologist. Plain radiographic images are visualized and preliminarily interpreted by the emergency physician with the below findings:    Interpretation per the Radiologist below, if available at the time of this note:    CTA HEAD W WO CONTRAST (CODE STROKE NIHSS 4 or Above)   Final Result       1. No flow-limiting stenosis or aneurysm identified in the anterior or posterior intracranial circulation. 2. Mural plaque at the proximal right internal carotid artery with 17% stenosis by NASCET criteria. 3. Moderate stenosis at the takeoff of the left subclavian artery. 4. 7 x 3 mm hypodense area in the mildly asymmetrically enlarged right tonsil. Differential considerations include tonsillar/peritonsillar abscess or phlegmon. Malignancy can't be excluded. Correlate with direct visualization. **This report has been created using voice recognition software. It may contain minor errors which are inherent in voice recognition technology. **      Final report electronically signed by Dr. aMrtha Dimas MD on 3/23/2020 2:03 PM      CTA NECK W WO CONTRAST (CODE STROKE NIHSS 4 or Above)   Final Result       1.  No flow-limiting stenosis or aneurysm identified in the anterior or

## 2020-03-23 NOTE — VIRTUAL HEALTH
[facility] Stroke and Vascular Neurology Consult for  [facility] Stroke Alert through [address] @ 0104 pm  3/23/2020 1:07 PM  Pt Name: Jaden Gloria  MRN: [id number]  YOB: 1960  Date of evaluation: 3/23/2020  Primary Care Physician: FAROOQ Westfall CNP  Reason for Evaluation: Stroke evaluation with Phone Consult, Discussion and Review of imaging    Jaden Gloria is a 62 y.o. male  with history of previous left-sided deficits and left facial droop, last known well 7:30 AM, worsening of his deficit, NIH scale 4 for dysarthria, drift in the left upper extremity and mild to moderate facial droop. Patient with history of bipolar disorder, diabetes, hyperlipidemia, hypertension, schizoaffective disorder and seizure he is on Lamictal    Medication on aspirin 81 mg daily. CT head : With multiple white matter disease and subcortical strokes, old lacunar wounds in the jenn    CTA H&N is pending    Initial blood pressure 816 systolic with pressure. Allergies  is allergic to sulfa antibiotics and sulphasomidine. Medications  Prior to Admission medications    Medication Sig Start Date End Date Taking?  Authorizing Provider   loxapine (LOXITANE) 10 MG capsule Take 10 mg by mouth 3 times daily    Historical Provider, MD   lisinopril (PRINIVIL;ZESTRIL) 5 MG tablet Take 5 mg by mouth daily    Historical Provider, MD   famotidine (PEPCID) 20 MG tablet Take 20 mg by mouth nightly as needed    Historical Provider, MD   pantoprazole (PROTONIX) 20 MG tablet Take 2 tablets by mouth daily for 30 doses 10/22/19 11/21/19  Avelina Dudley PA-C   ondansetron (ZOFRAN ODT) 4 MG disintegrating tablet Take 1 tablet by mouth every 8 hours as needed for Nausea 10/22/19   Lashanda Ceballos PA-C   dicyclomine (BENTYL) 10 MG capsule Take 1 capsule by mouth every 6 hours as needed (cramps) 10/22/19   Lashanda Ceballos PA-C   aluminum & magnesium hydroxide-simethicone ([address]) [phone] MG/5ML SUSP history of Bipolar 1 disorder ([facility] Cellulitis, COPD (chronic obstructive pulmonary disease) ([facility] Diabetes mellitus ([facility] Emphysema lung ([facility] Esophagitis, GERD (gastroesophageal reflux disease), Headache, Hyperlipidemia, Hypertension, Hypothyroidism, Intellectual disability, Mandible fracture ([facility] Multiple rib fractures, Schizoaffective disorder ([facility] Seizures ([facility] Sinusitis, and Subtrochanteric fracture ([facility] Social History  Social History     Socioeconomic History    Marital status: Single     Spouse name: Not on file    Number of children: Not on file    Years of education: Not on file    Highest education level: Not on file   Occupational History    Not on file   Social Needs    Financial resource strain: Not on file    Food insecurity     Worry: Not on file     Inability: Not on file    Transportation needs     Medical: Not on file     Non-medical: Not on file   Tobacco Use    Smoking status: Former Smoker     Packs/day: 1.00     Types: Cigarettes    Smokeless tobacco: Never Used   Substance and Sexual Activity    Alcohol use: No    Drug use: No    Sexual activity: Not on file   Lifestyle    Physical activity     Days per week: Not on file     Minutes per session: Not on file    Stress: Not on file   Relationships    Social connections     Talks on phone: Not on file     Gets together: Not on file     Attends [employer] service: Not on file     Active member of club or organization: Not on file     Attends meetings of clubs or organizations: Not on file     Relationship status: Not on file    Intimate partner violence     Fear of current or ex partner: Not on file     Emotionally abused: Not on file     Physically abused: Not on file     Forced sexual activity: Not on file   Other Topics Concern    Not on file   Social History Narrative    Not on file     Family History  No family history on file.     OBJECTIVE  Ht 5' 5\" (1.651 m)   Wt 145 lb (65.8 kg)   BMI 24.13 kg/m²     NIH Physician        This is a Phone Consult, I have not seen the patient face to face, the telemedicine device was not utilized. Update:  CTA head and neck with no significant stenosis in the intracranial vessels or the carotids.   Moderate stenosis in the left subclavian artery  Please continue the same plan above    Soo Bustamante MD   Stroke, Neurocritical Care And/or [facility] Stroke [address]  Electronically signed 3/23/2020 at 1:07 PM

## 2024-08-06 NOTE — ED TRIAGE NOTES
Pt presents to ED from [facility] via EMS with corrections officers at bedside. C/C left sided facial droop, slurred speech. LKW 0730 this morning. Per report pt has hx of stroke with residual left sided deficits but deficits are more prominent. Pt states he was awake when symptoms started. [Menopause Age____] : age at menopause was [unfilled] [History of Hormone Replacement Treatment] : has no history of hormone replacement treatment